# Patient Record
Sex: MALE
[De-identification: names, ages, dates, MRNs, and addresses within clinical notes are randomized per-mention and may not be internally consistent; named-entity substitution may affect disease eponyms.]

---

## 2017-11-13 PROBLEM — Z00.00 ENCOUNTER FOR PREVENTIVE HEALTH EXAMINATION: Status: ACTIVE | Noted: 2017-11-13

## 2017-12-11 ENCOUNTER — APPOINTMENT (OUTPATIENT)
Dept: SURGERY | Facility: CLINIC | Age: 47
End: 2017-12-11
Payer: COMMERCIAL

## 2017-12-11 VITALS
TEMPERATURE: 98.5 F | DIASTOLIC BLOOD PRESSURE: 88 MMHG | SYSTOLIC BLOOD PRESSURE: 137 MMHG | WEIGHT: 212 LBS | HEIGHT: 70 IN | OXYGEN SATURATION: 98 % | BODY MASS INDEX: 30.35 KG/M2 | HEART RATE: 85 BPM

## 2017-12-11 DIAGNOSIS — Z83.3 FAMILY HISTORY OF DIABETES MELLITUS: ICD-10-CM

## 2017-12-11 DIAGNOSIS — Z86.79 PERSONAL HISTORY OF OTHER DISEASES OF THE CIRCULATORY SYSTEM: ICD-10-CM

## 2017-12-11 DIAGNOSIS — Z78.9 OTHER SPECIFIED HEALTH STATUS: ICD-10-CM

## 2017-12-11 DIAGNOSIS — Z87.891 PERSONAL HISTORY OF NICOTINE DEPENDENCE: ICD-10-CM

## 2017-12-11 DIAGNOSIS — Z82.49 FAMILY HISTORY OF ISCHEMIC HEART DISEASE AND OTHER DISEASES OF THE CIRCULATORY SYSTEM: ICD-10-CM

## 2017-12-11 PROCEDURE — 99244 OFF/OP CNSLTJ NEW/EST MOD 40: CPT

## 2017-12-11 RX ORDER — METOPROLOL TARTRATE 75 MG/1
TABLET, FILM COATED ORAL
Refills: 0 | Status: ACTIVE | COMMUNITY

## 2018-01-22 ENCOUNTER — APPOINTMENT (OUTPATIENT)
Dept: SURGERY | Facility: CLINIC | Age: 48
End: 2018-01-22
Payer: COMMERCIAL

## 2018-01-22 VITALS
RESPIRATION RATE: 16 BRPM | BODY MASS INDEX: 30.92 KG/M2 | DIASTOLIC BLOOD PRESSURE: 83 MMHG | TEMPERATURE: 98.1 F | HEIGHT: 70 IN | SYSTOLIC BLOOD PRESSURE: 128 MMHG | HEART RATE: 73 BPM | WEIGHT: 216 LBS | OXYGEN SATURATION: 97 %

## 2018-01-22 DIAGNOSIS — L30.9 DERMATITIS, UNSPECIFIED: ICD-10-CM

## 2018-01-22 PROCEDURE — 99214 OFFICE O/P EST MOD 30 MIN: CPT

## 2018-04-23 ENCOUNTER — APPOINTMENT (OUTPATIENT)
Dept: SURGERY | Facility: CLINIC | Age: 48
End: 2018-04-23
Payer: COMMERCIAL

## 2018-04-23 VITALS
OXYGEN SATURATION: 98 % | HEIGHT: 70 IN | BODY MASS INDEX: 30.06 KG/M2 | WEIGHT: 210 LBS | SYSTOLIC BLOOD PRESSURE: 150 MMHG | RESPIRATION RATE: 16 BRPM | HEART RATE: 85 BPM | TEMPERATURE: 98.4 F | DIASTOLIC BLOOD PRESSURE: 87 MMHG

## 2018-04-23 PROCEDURE — 99213 OFFICE O/P EST LOW 20 MIN: CPT

## 2018-06-18 ENCOUNTER — APPOINTMENT (OUTPATIENT)
Dept: SURGERY | Facility: CLINIC | Age: 48
End: 2018-06-18
Payer: COMMERCIAL

## 2018-06-18 VITALS
HEIGHT: 70 IN | OXYGEN SATURATION: 99 % | SYSTOLIC BLOOD PRESSURE: 123 MMHG | TEMPERATURE: 98.4 F | HEART RATE: 86 BPM | WEIGHT: 210 LBS | RESPIRATION RATE: 16 BRPM | BODY MASS INDEX: 30.06 KG/M2 | DIASTOLIC BLOOD PRESSURE: 69 MMHG

## 2018-06-18 DIAGNOSIS — R10.33 PERIUMBILICAL PAIN: ICD-10-CM

## 2018-06-18 DIAGNOSIS — E66.9 OBESITY, UNSPECIFIED: ICD-10-CM

## 2018-06-18 DIAGNOSIS — K42.9 UMBILICAL HERNIA W/OUT OBSTRUCTION OR GANGRENE: ICD-10-CM

## 2018-06-18 PROCEDURE — 99214 OFFICE O/P EST MOD 30 MIN: CPT

## 2018-07-23 PROBLEM — Z78.9 ALCOHOL USE: Status: ACTIVE | Noted: 2017-12-11

## 2018-09-17 ENCOUNTER — APPOINTMENT (OUTPATIENT)
Dept: SURGERY | Facility: CLINIC | Age: 48
End: 2018-09-17

## 2018-09-26 ENCOUNTER — INPATIENT (INPATIENT)
Facility: HOSPITAL | Age: 48
LOS: 6 days | Discharge: HOME | DRG: 433 | End: 2018-10-03
Attending: INTERNAL MEDICINE | Admitting: SURGERY
Payer: COMMERCIAL

## 2018-09-26 VITALS — WEIGHT: 212.08 LBS | HEIGHT: 70 IN

## 2018-09-26 DIAGNOSIS — K42.0 UMBILICAL HERNIA WITH OBSTRUCTION, WITHOUT GANGRENE: ICD-10-CM

## 2018-09-26 DIAGNOSIS — I10 ESSENTIAL (PRIMARY) HYPERTENSION: ICD-10-CM

## 2018-09-26 DIAGNOSIS — K56.609 UNSPECIFIED INTESTINAL OBSTRUCTION, UNSPECIFIED AS TO PARTIAL VERSUS COMPLETE OBSTRUCTION: ICD-10-CM

## 2018-09-26 LAB
ABO RH CONFIRMATION: SIGNIFICANT CHANGE UP
ALBUMIN SERPL ELPH-MCNC: 1.8 G/DL — LOW (ref 3.3–5.2)
ALP SERPL-CCNC: 115 U/L — SIGNIFICANT CHANGE UP (ref 40–120)
ALT FLD-CCNC: 29 U/L — SIGNIFICANT CHANGE UP
ANION GAP SERPL CALC-SCNC: 10 MMOL/L — SIGNIFICANT CHANGE UP (ref 5–17)
ANISOCYTOSIS BLD QL: SLIGHT — SIGNIFICANT CHANGE UP
APTT BLD: 30.4 SEC — SIGNIFICANT CHANGE UP (ref 27.5–37.4)
AST SERPL-CCNC: 67 U/L — HIGH
BILIRUB SERPL-MCNC: 2.2 MG/DL — HIGH (ref 0.4–2)
BLD GP AB SCN SERPL QL: SIGNIFICANT CHANGE UP
BUN SERPL-MCNC: 8 MG/DL — SIGNIFICANT CHANGE UP (ref 8–20)
CALCIUM SERPL-MCNC: 7 MG/DL — LOW (ref 8.6–10.2)
CHLORIDE SERPL-SCNC: 93 MMOL/L — LOW (ref 98–107)
CO2 SERPL-SCNC: 31 MMOL/L — HIGH (ref 22–29)
CREAT SERPL-MCNC: 0.65 MG/DL — SIGNIFICANT CHANGE UP (ref 0.5–1.3)
EOSINOPHIL # BLD AUTO: 0.1 K/UL — SIGNIFICANT CHANGE UP (ref 0–0.5)
EOSINOPHIL NFR BLD AUTO: 1 % — SIGNIFICANT CHANGE UP (ref 0–6)
GLUCOSE SERPL-MCNC: 122 MG/DL — HIGH (ref 70–115)
HCT VFR BLD CALC: 32.6 % — LOW (ref 42–52)
HGB BLD-MCNC: 10.6 G/DL — LOW (ref 14–18)
HYPOCHROMIA BLD QL: SLIGHT — SIGNIFICANT CHANGE UP
INR BLD: 1.28 RATIO — HIGH (ref 0.88–1.16)
LACTATE BLDV-MCNC: 2.2 MMOL/L — HIGH (ref 0.5–2)
LACTATE BLDV-MCNC: 2.5 MMOL/L — HIGH (ref 0.5–2)
LIDOCAIN IGE QN: 24 U/L — SIGNIFICANT CHANGE UP (ref 22–51)
LYMPHOCYTES # BLD AUTO: 0.9 K/UL — LOW (ref 1–4.8)
LYMPHOCYTES # BLD AUTO: 7 % — LOW (ref 20–55)
MCHC RBC-ENTMCNC: 25 PG — LOW (ref 27–31)
MCHC RBC-ENTMCNC: 32.5 G/DL — SIGNIFICANT CHANGE UP (ref 32–36)
MCV RBC AUTO: 76.9 FL — LOW (ref 80–94)
MONOCYTES # BLD AUTO: 0.5 K/UL — SIGNIFICANT CHANGE UP (ref 0–0.8)
MONOCYTES NFR BLD AUTO: 5 % — SIGNIFICANT CHANGE UP (ref 3–10)
NEUTROPHILS # BLD AUTO: 11.2 K/UL — HIGH (ref 1.8–8)
NEUTROPHILS NFR BLD AUTO: 87 % — HIGH (ref 37–73)
PLAT MORPH BLD: NORMAL — SIGNIFICANT CHANGE UP
PLATELET # BLD AUTO: 285 K/UL — SIGNIFICANT CHANGE UP (ref 150–400)
POTASSIUM SERPL-MCNC: 3.2 MMOL/L — LOW (ref 3.5–5.3)
POTASSIUM SERPL-SCNC: 3.2 MMOL/L — LOW (ref 3.5–5.3)
PROT SERPL-MCNC: 5.5 G/DL — LOW (ref 6.6–8.7)
PROTHROM AB SERPL-ACNC: 14.2 SEC — HIGH (ref 9.8–12.7)
RBC # BLD: 4.24 M/UL — LOW (ref 4.6–6.2)
RBC # FLD: 17.8 % — HIGH (ref 11–15.6)
RBC BLD AUTO: ABNORMAL
SODIUM SERPL-SCNC: 134 MMOL/L — LOW (ref 135–145)
TYPE + AB SCN PNL BLD: SIGNIFICANT CHANGE UP
WBC # BLD: 12.6 K/UL — HIGH (ref 4.8–10.8)
WBC # FLD AUTO: 12.6 K/UL — HIGH (ref 4.8–10.8)

## 2018-09-26 PROCEDURE — 99284 EMERGENCY DEPT VISIT MOD MDM: CPT

## 2018-09-26 PROCEDURE — 74177 CT ABD & PELVIS W/CONTRAST: CPT | Mod: 26

## 2018-09-26 PROCEDURE — 99223 1ST HOSP IP/OBS HIGH 75: CPT

## 2018-09-26 PROCEDURE — 71045 X-RAY EXAM CHEST 1 VIEW: CPT | Mod: 26

## 2018-09-26 RX ORDER — PANTOPRAZOLE SODIUM 20 MG/1
40 TABLET, DELAYED RELEASE ORAL
Qty: 0 | Refills: 0 | Status: DISCONTINUED | OUTPATIENT
Start: 2018-09-26 | End: 2018-10-03

## 2018-09-26 RX ORDER — SODIUM CHLORIDE 9 MG/ML
1000 INJECTION, SOLUTION INTRAVENOUS ONCE
Qty: 0 | Refills: 0 | Status: COMPLETED | OUTPATIENT
Start: 2018-09-26 | End: 2018-09-26

## 2018-09-26 RX ORDER — SODIUM CHLORIDE 9 MG/ML
1000 INJECTION INTRAMUSCULAR; INTRAVENOUS; SUBCUTANEOUS ONCE
Qty: 0 | Refills: 0 | Status: COMPLETED | OUTPATIENT
Start: 2018-09-26 | End: 2018-09-26

## 2018-09-26 RX ORDER — METOPROLOL TARTRATE 50 MG
12.5 TABLET ORAL EVERY 12 HOURS
Qty: 0 | Refills: 0 | Status: DISCONTINUED | OUTPATIENT
Start: 2018-09-26 | End: 2018-09-27

## 2018-09-26 RX ORDER — HYDROMORPHONE HYDROCHLORIDE 2 MG/ML
0.5 INJECTION INTRAMUSCULAR; INTRAVENOUS; SUBCUTANEOUS EVERY 4 HOURS
Qty: 0 | Refills: 0 | Status: DISCONTINUED | OUTPATIENT
Start: 2018-09-26 | End: 2018-09-26

## 2018-09-26 RX ORDER — FUROSEMIDE 40 MG
20 TABLET ORAL ONCE
Qty: 0 | Refills: 0 | Status: COMPLETED | OUTPATIENT
Start: 2018-09-26 | End: 2018-09-26

## 2018-09-26 RX ORDER — ONDANSETRON 8 MG/1
4 TABLET, FILM COATED ORAL EVERY 6 HOURS
Qty: 0 | Refills: 0 | Status: DISCONTINUED | OUTPATIENT
Start: 2018-09-26 | End: 2018-10-03

## 2018-09-26 RX ORDER — SODIUM CHLORIDE 9 MG/ML
3 INJECTION INTRAMUSCULAR; INTRAVENOUS; SUBCUTANEOUS ONCE
Qty: 0 | Refills: 0 | Status: COMPLETED | OUTPATIENT
Start: 2018-09-26 | End: 2018-09-26

## 2018-09-26 RX ORDER — ACETAMINOPHEN 500 MG
1000 TABLET ORAL ONCE
Qty: 0 | Refills: 0 | Status: DISCONTINUED | OUTPATIENT
Start: 2018-09-26 | End: 2018-10-03

## 2018-09-26 RX ORDER — POTASSIUM CHLORIDE 20 MEQ
10 PACKET (EA) ORAL
Qty: 0 | Refills: 0 | Status: COMPLETED | OUTPATIENT
Start: 2018-09-26 | End: 2018-09-26

## 2018-09-26 RX ORDER — MORPHINE SULFATE 50 MG/1
4 CAPSULE, EXTENDED RELEASE ORAL ONCE
Qty: 0 | Refills: 0 | Status: DISCONTINUED | OUTPATIENT
Start: 2018-09-26 | End: 2018-09-26

## 2018-09-26 RX ORDER — SODIUM CHLORIDE 9 MG/ML
1000 INJECTION, SOLUTION INTRAVENOUS
Qty: 0 | Refills: 0 | Status: DISCONTINUED | OUTPATIENT
Start: 2018-09-26 | End: 2018-09-27

## 2018-09-26 RX ORDER — SPIRONOLACTONE 25 MG/1
25 TABLET, FILM COATED ORAL DAILY
Qty: 0 | Refills: 0 | Status: DISCONTINUED | OUTPATIENT
Start: 2018-09-26 | End: 2018-09-27

## 2018-09-26 RX ORDER — ONDANSETRON 8 MG/1
4 TABLET, FILM COATED ORAL ONCE
Qty: 0 | Refills: 0 | Status: COMPLETED | OUTPATIENT
Start: 2018-09-26 | End: 2018-09-26

## 2018-09-26 RX ORDER — METOPROLOL TARTRATE 50 MG
25 TABLET ORAL DAILY
Qty: 0 | Refills: 0 | Status: DISCONTINUED | OUTPATIENT
Start: 2018-09-26 | End: 2018-09-26

## 2018-09-26 RX ORDER — HYDROCHLOROTHIAZIDE 25 MG
25 TABLET ORAL DAILY
Qty: 0 | Refills: 0 | Status: DISCONTINUED | OUTPATIENT
Start: 2018-09-26 | End: 2018-09-26

## 2018-09-26 RX ORDER — ENOXAPARIN SODIUM 100 MG/ML
40 INJECTION SUBCUTANEOUS DAILY
Qty: 0 | Refills: 0 | Status: DISCONTINUED | OUTPATIENT
Start: 2018-09-26 | End: 2018-09-27

## 2018-09-26 RX ADMIN — SODIUM CHLORIDE 150 MILLILITER(S): 9 INJECTION, SOLUTION INTRAVENOUS at 17:49

## 2018-09-26 RX ADMIN — Medication 100 MILLIEQUIVALENT(S): at 17:48

## 2018-09-26 RX ADMIN — SODIUM CHLORIDE 1000 MILLILITER(S): 9 INJECTION, SOLUTION INTRAVENOUS at 13:42

## 2018-09-26 RX ADMIN — SODIUM CHLORIDE 150 MILLILITER(S): 9 INJECTION, SOLUTION INTRAVENOUS at 15:16

## 2018-09-26 RX ADMIN — SODIUM CHLORIDE 3 MILLILITER(S): 9 INJECTION INTRAMUSCULAR; INTRAVENOUS; SUBCUTANEOUS at 11:11

## 2018-09-26 RX ADMIN — Medication 100 MILLIEQUIVALENT(S): at 13:29

## 2018-09-26 RX ADMIN — MORPHINE SULFATE 4 MILLIGRAM(S): 50 CAPSULE, EXTENDED RELEASE ORAL at 11:11

## 2018-09-26 RX ADMIN — MORPHINE SULFATE 4 MILLIGRAM(S): 50 CAPSULE, EXTENDED RELEASE ORAL at 11:26

## 2018-09-26 RX ADMIN — Medication 104 MILLIGRAM(S): at 19:21

## 2018-09-26 RX ADMIN — ONDANSETRON 4 MILLIGRAM(S): 8 TABLET, FILM COATED ORAL at 11:11

## 2018-09-26 RX ADMIN — SODIUM CHLORIDE 1000 MILLILITER(S): 9 INJECTION INTRAMUSCULAR; INTRAVENOUS; SUBCUTANEOUS at 11:32

## 2018-09-26 RX ADMIN — Medication 12.5 MILLIGRAM(S): at 17:48

## 2018-09-26 RX ADMIN — SODIUM CHLORIDE 1000 MILLILITER(S): 9 INJECTION INTRAMUSCULAR; INTRAVENOUS; SUBCUTANEOUS at 11:11

## 2018-09-26 RX ADMIN — Medication 100 MILLIEQUIVALENT(S): at 15:15

## 2018-09-26 NOTE — ED ADULT TRIAGE NOTE - CHIEF COMPLAINT QUOTE
"I am having abdominal pain in the center of my abdomen I know that I have an umbilical hernia and I saw Dr Briseno about a month ago and he said if it worsened to come in" Pt states he has nausea and vomiting and is unable to use the bathroom.

## 2018-09-26 NOTE — H&P ADULT - ASSESSMENT
> Cirrhosis with portal hypertension and ascites - likely secondary to ETOH abuse.  Start on diuretics, aldactone.  Need for alcohol cessation d/w patient.  He is aware of the severity of his illness, was in agreement.  Left message for IR for therapeutic paracentesis, may be performed non emergently.  > HTN - Continue BBlocker.  Will Hold HCTZ as pt will be diuresed.  > Paraesophageal varices - No s/s of bleeding.  Monitor H/H.  Start on PPI.  > DVT Prophylaxis - Lower extremity intermittent compression devices.
49 y/o male with incarcerated umbilical hernia, possibly strangulated - CT scan pending. Leukocytosis 12.6, tacyhcardic with lactate 2.2

## 2018-09-26 NOTE — CONSULT NOTE ADULT - ASSESSMENT
47 y/o M h/o HTN, alcohol use with clinical and radiographic evidence of liver cirrhosis with ascites. Patient has umbilical hernia that is fluid filled and non-obstructing on physical exam and CT. Currently hemodynamically stable, afebrile.    Plan:  -No surgical intervention at this time  -Admit to medical service for ascites  -Recommend gastroenterology consultation for possible intervention  -Patient should follow-up with Dr. Russell in the outpatient setting for umbilical hernia    Plan discussed with attending Dr. Soto.

## 2018-09-26 NOTE — CONSULT NOTE ADULT - SUBJECTIVE AND OBJECTIVE BOX
ACUTE CARE SURGERY CONSULT    Consulting surgical team: [facility] - Acute Care Surgery  Consulting attending: Dr. Soto  Patient seen and examined: 09-26-18 @ 15:16    HPI: 47 y/o M h/o HTN, alcohol use >30 years that presents with abdominal pain. Surgery consulted for possible umbilical hernia. Patient states he has had intermittent mild generalized abdominal pain for about 2 months that is not improved or relieved by anything. Yesterday, abdominal pain became severe in nature, and was periumbilical in location. Pain described as pressure. Associated nausea and multiple episodes of vomiting over the past several days. Last bowel movement and/flatus was 1 week ago. He notes new abdominal swelling over the past week or so. No fever, chills, chest pain, or dyspnea. Patient is seen by Dr. Russell as an outpatient for umbilical hernia management.     PMHx: HTN, takes pill for "water retention"  PSHx: denies   Allergies: PCN - unsure of reaction, states he was told as a child he was allergic  Medications: anti-hypertensive (unsure of name), "water pill" (26 Sep 2018 12:59)      PAST MEDICAL HISTORY:  Hypertension    PAST SURGICAL HISTORY:  No significant past surgical history    ALLERGIES:  penicillin (Hives)    MEDICATIONS  (STANDING):  clindamycin IVPB 900 milliGRAM(s) IV Intermittent once  enoxaparin Injectable 40 milliGRAM(s) SubCutaneous daily  hydrochlorothiazide 25 milliGRAM(s) Oral daily  lactated ringers. 1000 milliLiter(s) (150 mL/Hr) IV Continuous <Continuous>  metoprolol succinate ER 25 milliGRAM(s) Oral daily  potassium chloride  10 mEq/100 mL IVPB 10 milliEquivalent(s) IV Intermittent every 1 hour    MEDICATIONS  (PRN):  acetaminophen  IVPB .. 1000 milliGRAM(s) IV Intermittent once PRN Mild Pain (1 - 3)  HYDROmorphone  Injectable 0.5 milliGRAM(s) IV Push every 4 hours PRN Moderate Pain (4 - 6)  ondansetron Injectable 4 milliGRAM(s) IV Push every 6 hours PRN Nausea and/or Vomiting      VITALS & I/Os:  Vital Signs Last 24 Hrs  T(C): 37 (26 Sep 2018 12:04), Max: 37 (26 Sep 2018 12:04)  T(F): 98.6 (26 Sep 2018 12:04), Max: 98.6 (26 Sep 2018 12:04)  HR: 84 (26 Sep 2018 12:04) (84 - 105)  BP: 122/71 (26 Sep 2018 12:04) (122/71 - 125/78)  BP(mean): --  RR: 20 (26 Sep 2018 12:04) (18 - 20)  SpO2: 95% (26 Sep 2018 12:04) (95% - 96%)  CAPILLARY BLOOD GLUCOSE      General: NAD, AOx3, comfortable on examination  HEENT: PERRLA, EOMI, dry mucous membranes  Neck: supple, nontender  Cardiovascular: heart RRR, no murmurs  Respiratory: no respiratory distress, CTAB  Abdomen: soft, nontender. Positive fluid wave test. Umbilical hernia filled with fluid, about 0.5 cm defect, no bowel or fat content within hernia. No guarding or rebound. Normal bowel sounds.  Extremities: no peripheral edema. Normal ROM.  Integumentary: warm, no rash  Neuro: no motor or sensory deficits    LABS:                        10.6   12.6  )-----------( 285      ( 26 Sep 2018 11:02 )             32.6     09-26    134<L>  |  93<L>  |  8.0  ----------------------------<  122<H>  3.2<L>   |  31.0<H>  |  0.65    Ca    7.0<L>      26 Sep 2018 11:02    TPro  5.5<L>  /  Alb  1.8<L>  /  TBili  2.2<H>  /  DBili  x   /  AST  67<H>  /  ALT  29  /  AlkPhos  115  09-26      PT/INR - ( 26 Sep 2018 14:01 )   PT: 14.2 sec;   INR: 1.28 ratio         PTT - ( 26 Sep 2018 13:17 )  PTT:30.4 sec    09-26 @ 11:03  2.2        IMAGING:    EXAM:  CT ABDOMEN AND PELVIS OC IC                          PROCEDURE DATE:  09/26/2018      INTERPRETATION:  DATE: 9/26/2018 12:52 PM  COMPARISON: None  CLINICAL INFORMATION: Abdominal pain, umbilical hernia  PROCEDURE:  CT of the Abdomen and Pelvis was performed withintravenous   contrast.  90 ml of Omnipaque 350 was injected intravenously.     Oral   contrast was not administered secondary to ordering physician request      FINDINGS:    LOWER CHEST: Trace to small bilateral pleural effusions with minimal   bibasilar atelectasis.    ABDOMEN AND PELVIS:    LIVER: Morphologic changes in the liver compatible with cirrhosis.   Extensive areas of decreased attenuation throughout the liver   particularly in the right hepatic lobe may represent areas of severe   hepatic steatosis. Round areas of hypoattenuation in the left hepatic   lobe may represent areas of fatty infiltration however focal hepatic   masses cannot be excluded. An infiltrating hepatocellular carcinoma also   cannot be excluded. Small area of hypervascularity in the right hepatic   dome measuring 0.6 cm, series 2 image 24. Rosa hepatis and portacaval   lymph nodes likely reactive in nature. Portal vein is patent. Large   paraesophageal varices identified. Patent umbilical vein. A splenorenal   shunt is visualized.  BILE DUCTS: normal caliber.  GALLBLADDER: No calcified gallstones. Normal caliber wall.  PANCREAS: within normal limits.  SPLEEN: within normal limits.    ADRENALS: within normal limits.  KIDNEYS, URETERS AND BLADDER: Kidneys enhance bilaterally and   symmetrically without hydronephrosis, renal mass or renal calculus. The   ureters and bladder are within normal limits.    REPRODUCTIVE ORGANS: No pelvic masses. Mild pelvic free fluid.No pelvic   lymphadenopathy.    BOWEL: No bowel obstruction. Colonic diverticulosis without   diverticulitis. Mild thickening of the right colon likely related to the   cirrhosis and portal hypertension. Mild thickening of a mild amount of   small bowel in the midabdomen suggesting jejunitis and/or ileitis..   Normal appendix. Mesenteric and right lower quadrant lymph nodes. A   normal appendix was visualized.No enlarged mesenteric lymph nodes.  PERITONEUM: Moderate abdominal pelvic ascites. Mesenteric edema. No free   air.    VESSELS: Atherosclerotic changes .Findings as described above consistent   with portal hypertension. Accessory right hepatic artery from the   superior mesenteric artery, a normal variant.  RETROPERITONEUM:     Within normal limits.      ABDOMINAL WALL: Umbilical hernia and supraumbilical hernia containing   fluid. Mild to moderate anasarca.  BONES: Degenerative changes.    IMPRESSION:      Cirrhosis with portal hypertension with moderate abdominal and pelvic   ascites and mesenteric edema. Large paraesophageal varices and a   splenorenal shunt.    Diffuse areas of hypoattenuation throughout the right and left hepatic   lobe which may represent periportal fat deposition however hepatocellular   carcinoma and/or infiltrating hepatocellular carcinoma cannot be   excluded. Further evaluation with MRI of the abdomen with and without   contrast is recommended and can be obtained in a nonemergent basis.    No bowel obstruction.  Mild thickening of a mild amount of small bowel in   the midabdomen suggesting jejunitis and/or ileitis    Umbilical and supraumbilical hernias containing fluid.    SIERRA GRAMAJO M.D., ATTENDING RADIOLOGIST  This document has been electronically signed. Sep 26 2018  1:22PM

## 2018-09-26 NOTE — H&P ADULT - NSHPPHYSICALEXAM_GEN_ALL_CORE
PHYSICAL EXAMINATION:  GENERAL: NAD, Alert and Oriented x 3 HEENT:  Normocephalic and atraumatic.  Extraocular muscles are intact.  NECK: Supple.  - JVD.  CARDIOVASCULAR: RRR S1, S2.  LUNGS: CTAB, - rales, - wheezing, - rhonchi.  BACK: - CVA tenderness.  ABDOMEN: soft, (mild umbilical) tenderness, (+) distension with ascites, (+) bowel sounds, (-) guarding, (-) rebound (-) rigidity, soft umbilical hernia, appears to be fluid filled. EXTREMITIES: - cyanosis, - clubbing, + edema.  NEUROLOGIC: strength is symmetric, sensation intact, speech fluent.  PSYCHIATRIC: Calm.  - agitation.    SKIN: - rashes, - lesions.
MEDICATIONS  (STANDING):  clindamycin IVPB 900 milliGRAM(s) IV Intermittent once  enoxaparin Injectable 40 milliGRAM(s) SubCutaneous daily  lactated ringers Bolus 1000 milliLiter(s) IV Bolus once  lactated ringers. 1000 milliLiter(s) (150 mL/Hr) IV Continuous <Continuous>  potassium chloride  10 mEq/100 mL IVPB 10 milliEquivalent(s) IV Intermittent every 1 hour    MEDICATIONS  (PRN):  acetaminophen  IVPB .. 1000 milliGRAM(s) IV Intermittent once PRN Mild Pain (1 - 3)  HYDROmorphone  Injectable 0.5 milliGRAM(s) IV Push every 4 hours PRN Mild Pain (1 - 3)      Vital Signs Last 24 Hrs  T(C): 37 (26 Sep 2018 12:04), Max: 37 (26 Sep 2018 12:04)  T(F): 98.6 (26 Sep 2018 12:04), Max: 98.6 (26 Sep 2018 12:04)  HR: 84 (26 Sep 2018 12:04) (84 - 105)  BP: 122/71 (26 Sep 2018 12:04) (122/71 - 125/78)  BP(mean): --  RR: 20 (26 Sep 2018 12:04) (18 - 20)  SpO2: 95% (26 Sep 2018 12:04) (95% - 96%)    Constitutional: well developed well nourished male, NAD  HEENT: EOMI / PERRL b/l, no active drainage or redness  Neck: No JVD, ROM without pain  Respiratory: respirations are unlabored, no accessory muscle use, no conversational dyspnea  Cardiovascular: regular rate & rhythm  Gastrointestinal: abdomen significantly distended, + tympanic to percussion, umbilical hernia unable to be reduced, skin slightly hyperpigmented not erythematous or cellulitic, mild TTP, no peritoneal signs, no rebound tenderness / guarding  Neurological: GCS: 15 (4/5/6). A&O x 3; no gross sensory / motor / coordination deficits  Psychiatric: Normal mood, normal affect  Musculoskeletal: No joint pain, swelling or deformity; no limitation of movement

## 2018-09-26 NOTE — PROGRESS NOTE ADULT - ASSESSMENT
> Cirrhosis with portal hypertension and ascites - likely secondary to ETOH abuse.  Start on diuretics, aldactone.  Need for alcohol cessation d/w patient.  He is aware of the severity of his illness, was in agreement.  Left message for IR for therapeutic paracentesis, may be performed non emergently.  > HTN - Continue BBlocker.  Will Hold HCTZ as pt will be diuresed.  > Paraesophageal varices - No s/s of bleeding.  Monitor H/H.  Start on PPI.  > DVT Prophylaxis - Lower extremity intermittent compression devices.

## 2018-09-26 NOTE — ED STATDOCS - CARDIAC, MLM
normal rate, regular rhythm, and no murmur. normal rate, regular rhythm, and no murmur. 2+ pitting edema of LE.

## 2018-09-26 NOTE — H&P ADULT - PROBLEM SELECTOR PLAN 2
- Clarified with pts pharmacy ([facility]) he is on HCTZ 12.5 QD and metoprolol 25 QD.

## 2018-09-26 NOTE — ED STATDOCS - OBJECTIVE STATEMENT
49 y/o M pt with hx of HTN, retain water presents to ED c/o protrusion/ bulging of his abdominal x2 year worsening since, with associate pain, N/V and constipation x1 week. Last BM last Wednesday. Unable to tolerate PO.  Allergy to penicillin. He states his doctor told him to go to ED if symptoms increase. He has episodes of worsening distension when hernia flares up. Denies surgery of abd, fever, chills, CP, back pain,

## 2018-09-26 NOTE — ED STATDOCS - PROGRESS NOTE DETAILS
Lactate of 2.2, will order LR bolus, spoke to surgery who will come see pt surgery team saw the patient, will take the patient to OR under Dr. Soto

## 2018-09-26 NOTE — ED STATDOCS - MEDICAL DECISION MAKING DETAILS
PT with will order CT abdomen. Pt likely with abdominal strangulated hernia, will get lower CT scan lactate and surgical consult.

## 2018-09-26 NOTE — ED ADULT NURSE NOTE - CHIEF COMPLAINT QUOTE
"I am having abdominal pain in the center of my abdomen I know that I have an umbilical hernia and I saw Dr Briseno about a month ago and he said if it worsened to come in" Pt states he has nausea and vomiting and is unable to use the bathroom.
musculoskeletal/cognitive

## 2018-09-26 NOTE — H&P ADULT - HISTORY OF PRESENT ILLNESS
47 y/o male presents to ED complaining of 1 day of pain at site of umbilical hernia. Pt states he first developed hernia approx 2 years ago, it has never caused him pain until yesterday, and he had previously been able to self-reduce it. Pt reports yesterday he had acute onset of pain at hernia site and was no longer able to reduce it. Describes it as a sharp, constant pain that is localized to caroline-umbilical area. Associated with nausea and vomiting - states he has been unable to tolerate any oral intake. States last BM was 1 week ago and denies having passed flatus over past few days. Denies fever / chills, chest pain, SOB, urinary symptoms, recent travel, sick contacts.    PMHx: HTN, takes pill for "water retention"  PSHx: denies   Allergies: PCN - unsure of reaction, states he was told as a child he was allergic  Medications: anti-hypertensive (unsure of name), "water pill"
48M h/o HTN, EtOH Abuse, Cirrhosis x 2.5 years, presented to ER with abdominal pain x 3 days, seen by surgery for possible incarcerated umbilical hernia.  CT A/P showed cirrhosis with significant ascites.  Per surgery, no incarcerated bowel.  Pt admits to daily EtOH use since age 18.  States he was diagnosed with cirrhosis in [facility] 2.5y ago, but has not followed up.  Noted worsening edema and that his umbilical hernia was not reducible.  Last BM ~ 5 days ago.  No nausea / vomiting.  Denies SOB.  No additional complaints.     FAMILY HISTORY: reviewed.  No history of liver disease in mother, father.   SOCIAL HISTORY: + alcohol, - IVDA, - smoking.  REVIEW OF SYSTEMS: General: - fatigue, - weight loss, - fevers, - chills.  HEENT: - headache, - hearing disturbances.  EYES: - visual disturbances, - diplopia.  CARDIOVASCULAR: - chest pain, - palpitations.  PULMONARY: - SOB, - cough, - hemoptysis.  GI: + abdominal pain, - nausea, - vomiting, - diarrhea, - constipation.  : - urinary urgency, - frequency, - dysuria.  MUSCULOSKELETAL: - arthralgias, - myalgias.  NEURO:  - weakness, - numbness.  PSYCH: - depression, - anxiety, - suicidal thoughts. SKIN: - rashes, - lesions.  All remaining ROS are negative

## 2018-09-26 NOTE — H&P ADULT - PROBLEM SELECTOR PLAN 1
- Admit to ACS service under Dr. Soto  - Patient will be ADD-ON for OR today - Admit to ACS service under Dr. Soto  - Patient will be ADD ON for OR today for hernia repair  - NPO with LR @ 150cc/hr  - Rcving LR bolus for elevated lactate - will f/u rpt lactate post-op  - IV K+ repletion   - Clinda for SCIP (PCN allergy)  - Pain control PRN  - DVT ppx with lovenox & b/l SCDs  - Incentive spirometer for pulm toileting

## 2018-09-26 NOTE — H&P ADULT - NSHPLABSRESULTS_GEN_ALL_CORE
VITALS:  Vital Signs Last 24 Hrs  T(C): 36.8 (26 Sep 2018 15:54), Max: 37 (26 Sep 2018 12:04)  T(F): 98.2 (26 Sep 2018 15:54), Max: 98.6 (26 Sep 2018 12:04)  HR: 82 (26 Sep 2018 15:54) (82 - 105)  BP: 123/76 (26 Sep 2018 15:54) (122/71 - 125/78)  BP(mean): --  RR: 20 (26 Sep 2018 12:04) (18 - 20)  SpO2: 97% (26 Sep 2018 15:54) (95% - 97%) Daily Height in cm: 177.8 (26 Sep 2018 10:01)    Daily   CAPILLARY BLOOD GLUCOSE        I&O's Summary      LABS:                        10.6   12.6  )-----------( 285      ( 26 Sep 2018 11:02 )             32.6     09-26    134<L>  |  93<L>  |  8.0  ----------------------------<  122<H>  3.2<L>   |  31.0<H>  |  0.65    Ca    7.0<L>      26 Sep 2018 11:02    TPro  5.5<L>  /  Alb  1.8<L>  /  TBili  2.2<H>  /  DBili  x   /  AST  67<H>  /  ALT  29  /  AlkPhos  115  09-26    PT/INR - ( 26 Sep 2018 14:01 )   PT: 14.2 sec;   INR: 1.28 ratio         PTT - ( 26 Sep 2018 13:17 )  PTT:30.4 sec  LIVER FUNCTIONS - ( 26 Sep 2018 11:02 )  Alb: 1.8 g/dL / Pro: 5.5 g/dL / ALK PHOS: 115 U/L / ALT: 29 U/L / AST: 67 U/L / GGT: x                   MEDICATIONS:  acetaminophen  IVPB .. 1000 milliGRAM(s) IV Intermittent once PRN  clindamycin IVPB 900 milliGRAM(s) IV Intermittent once  enoxaparin Injectable 40 milliGRAM(s) SubCutaneous daily  hydrochlorothiazide 25 milliGRAM(s) Oral daily  HYDROmorphone  Injectable 0.5 milliGRAM(s) IV Push every 4 hours PRN  lactated ringers. 1000 milliLiter(s) IV Continuous <Continuous>  metoprolol succinate ER 25 milliGRAM(s) Oral daily  ondansetron Injectable 4 milliGRAM(s) IV Push every 6 hours PRN  potassium chloride  10 mEq/100 mL IVPB 10 milliEquivalent(s) IV Intermittent every 1 hour
10.6   12.6  )-----------( 285      ( 26 Sep 2018 11:02 )             32.6     09-26    134<L>  |  93<L>  |  8.0  ----------------------------<  122<H>  3.2<L>   |  31.0<H>  |  0.65    Ca    7.0<L>      26 Sep 2018 11:02    TPro  5.5<L>  /  Alb  1.8<L>  /  TBili  2.2<H>  /  DBili  x   /  AST  67<H>  /  ALT  29  /  AlkPhos  115  09-26

## 2018-09-26 NOTE — ED ADULT NURSE NOTE - OBJECTIVE STATEMENT
pt c/o worsening hernia symptoms and unable to tolerate any PO intake for almost a week. Last BM unknown, approx 4-5 days ago pt c/o worsening hernia symptoms and unable to tolerate any PO intake for almost a week. Last BM unknown, approx 4-5 days ago.  Patient has +4 edema to lower extremities.

## 2018-09-26 NOTE — ED ADULT NURSE NOTE - NSIMPLEMENTINTERV_GEN_ALL_ED
Implemented All Universal Safety Interventions:  Gay to call system. Call bell, personal items and telephone within reach. Instruct patient to call for assistance. Room bathroom lighting operational. Non-slip footwear when patient is off stretcher. Physically safe environment: no spills, clutter or unnecessary equipment. Stretcher in lowest position, wheels locked, appropriate side rails in place.

## 2018-09-26 NOTE — ED STATDOCS - GASTROINTESTINAL, MLM
abdomen soft,  and non-distended. Bowel sounds present. abdomen with umbilical hernia, diffuse abdominal tenderness throughout.

## 2018-09-27 DIAGNOSIS — K70.31 ALCOHOLIC CIRRHOSIS OF LIVER WITH ASCITES: ICD-10-CM

## 2018-09-27 LAB
AFP-TM SERPL-MCNC: 7.5 NG/ML — SIGNIFICANT CHANGE UP
ALBUMIN SERPL ELPH-MCNC: 1.6 G/DL — LOW (ref 3.3–5.2)
ALP SERPL-CCNC: 89 U/L — SIGNIFICANT CHANGE UP (ref 40–120)
ALT FLD-CCNC: 23 U/L — SIGNIFICANT CHANGE UP
ANION GAP SERPL CALC-SCNC: 8 MMOL/L — SIGNIFICANT CHANGE UP (ref 5–17)
AST SERPL-CCNC: 52 U/L — HIGH
B PERT IGG+IGM PNL SER: ABNORMAL
BILIRUB SERPL-MCNC: 1.7 MG/DL — SIGNIFICANT CHANGE UP (ref 0.4–2)
BUN SERPL-MCNC: 9 MG/DL — SIGNIFICANT CHANGE UP (ref 8–20)
CALCIUM SERPL-MCNC: 6.8 MG/DL — LOW (ref 8.6–10.2)
CHLORIDE SERPL-SCNC: 95 MMOL/L — LOW (ref 98–107)
CO2 SERPL-SCNC: 29 MMOL/L — SIGNIFICANT CHANGE UP (ref 22–29)
COLOR FLD: YELLOW
CREAT SERPL-MCNC: 0.62 MG/DL — SIGNIFICANT CHANGE UP (ref 0.5–1.3)
FLUID INTAKE SUBSTANCE CLASS: SIGNIFICANT CHANGE UP
FLUID SEGMENTED GRANULOCYTES: 71 % — SIGNIFICANT CHANGE UP
GLUCOSE SERPL-MCNC: 99 MG/DL — SIGNIFICANT CHANGE UP (ref 70–115)
GRAM STN FLD: SIGNIFICANT CHANGE UP
HCT VFR BLD CALC: 27.4 % — LOW (ref 42–52)
HGB BLD-MCNC: 9.3 G/DL — LOW (ref 14–18)
LACTATE SERPL-SCNC: 1.7 MMOL/L — SIGNIFICANT CHANGE UP (ref 0.5–2)
LYMPHOCYTES # FLD: 14 % — SIGNIFICANT CHANGE UP
MCHC RBC-ENTMCNC: 25.6 PG — LOW (ref 27–31)
MCHC RBC-ENTMCNC: 33.9 G/DL — SIGNIFICANT CHANGE UP (ref 32–36)
MCV RBC AUTO: 75.5 FL — LOW (ref 80–94)
MESOTHL CELL # FLD: 3 % — SIGNIFICANT CHANGE UP
MONOS+MACROS # FLD: 12 % — SIGNIFICANT CHANGE UP
PLATELET # BLD AUTO: 220 K/UL — SIGNIFICANT CHANGE UP (ref 150–400)
POTASSIUM SERPL-MCNC: 3.1 MMOL/L — LOW (ref 3.5–5.3)
POTASSIUM SERPL-SCNC: 3.1 MMOL/L — LOW (ref 3.5–5.3)
PROT SERPL-MCNC: 4.7 G/DL — LOW (ref 6.6–8.7)
RBC # BLD: 3.63 M/UL — LOW (ref 4.6–6.2)
RBC # FLD: 17.5 % — HIGH (ref 11–15.6)
RCV VOL RI: 975 /UL — HIGH (ref 0–5)
SODIUM SERPL-SCNC: 132 MMOL/L — LOW (ref 135–145)
SPECIMEN SOURCE: SIGNIFICANT CHANGE UP
TOTAL NUCLEATED CELL COUNT, BODY FLUID: 168 /UL — HIGH (ref 0–5)
TUBE TYPE: SIGNIFICANT CHANGE UP
WBC # BLD: 4.9 K/UL — SIGNIFICANT CHANGE UP (ref 4.8–10.8)
WBC # FLD AUTO: 4.9 K/UL — SIGNIFICANT CHANGE UP (ref 4.8–10.8)

## 2018-09-27 PROCEDURE — 99223 1ST HOSP IP/OBS HIGH 75: CPT

## 2018-09-27 PROCEDURE — 99232 SBSQ HOSP IP/OBS MODERATE 35: CPT

## 2018-09-27 PROCEDURE — 93010 ELECTROCARDIOGRAM REPORT: CPT

## 2018-09-27 RX ORDER — CEFTRIAXONE 500 MG/1
1 INJECTION, POWDER, FOR SOLUTION INTRAMUSCULAR; INTRAVENOUS ONCE
Qty: 0 | Refills: 0 | Status: COMPLETED | OUTPATIENT
Start: 2018-09-27 | End: 2018-09-27

## 2018-09-27 RX ORDER — POTASSIUM CHLORIDE 20 MEQ
10 PACKET (EA) ORAL
Qty: 0 | Refills: 0 | Status: COMPLETED | OUTPATIENT
Start: 2018-09-27 | End: 2018-09-27

## 2018-09-27 RX ORDER — CEFTRIAXONE 500 MG/1
INJECTION, POWDER, FOR SOLUTION INTRAMUSCULAR; INTRAVENOUS
Qty: 0 | Refills: 0 | Status: DISCONTINUED | OUTPATIENT
Start: 2018-09-27 | End: 2018-09-27

## 2018-09-27 RX ORDER — SPIRONOLACTONE 25 MG/1
100 TABLET, FILM COATED ORAL DAILY
Qty: 0 | Refills: 0 | Status: DISCONTINUED | OUTPATIENT
Start: 2018-09-27 | End: 2018-10-01

## 2018-09-27 RX ORDER — CARVEDILOL PHOSPHATE 80 MG/1
6.25 CAPSULE, EXTENDED RELEASE ORAL EVERY 12 HOURS
Qty: 0 | Refills: 0 | Status: DISCONTINUED | OUTPATIENT
Start: 2018-09-27 | End: 2018-10-03

## 2018-09-27 RX ORDER — INFLUENZA VIRUS VACCINE 15; 15; 15; 15 UG/.5ML; UG/.5ML; UG/.5ML; UG/.5ML
0.5 SUSPENSION INTRAMUSCULAR ONCE
Qty: 0 | Refills: 0 | Status: COMPLETED | OUTPATIENT
Start: 2018-09-27 | End: 2018-09-27

## 2018-09-27 RX ADMIN — CEFTRIAXONE 100 GRAM(S): 500 INJECTION, POWDER, FOR SOLUTION INTRAMUSCULAR; INTRAVENOUS at 12:30

## 2018-09-27 RX ADMIN — SPIRONOLACTONE 100 MILLIGRAM(S): 25 TABLET, FILM COATED ORAL at 15:56

## 2018-09-27 RX ADMIN — SPIRONOLACTONE 25 MILLIGRAM(S): 25 TABLET, FILM COATED ORAL at 06:32

## 2018-09-27 RX ADMIN — CARVEDILOL PHOSPHATE 6.25 MILLIGRAM(S): 80 CAPSULE, EXTENDED RELEASE ORAL at 17:09

## 2018-09-27 RX ADMIN — Medication 12.5 MILLIGRAM(S): at 06:31

## 2018-09-27 RX ADMIN — PANTOPRAZOLE SODIUM 40 MILLIGRAM(S): 20 TABLET, DELAYED RELEASE ORAL at 06:31

## 2018-09-27 RX ADMIN — Medication 100 MILLIEQUIVALENT(S): at 10:07

## 2018-09-27 RX ADMIN — Medication 100 MILLIEQUIVALENT(S): at 13:08

## 2018-09-27 NOTE — CONSULT NOTE ADULT - SUBJECTIVE AND OBJECTIVE BOX
Patient is a 48y old  Male who presents with a chief complaint of Umbilical Pain (26 Sep 2018 16:13)      HPI:  48M h/o HTN, EtOH Abuse, Cirrhosis x 2.5 years, presented to ER with abdominal pain x 3 days, seen by surgery for possible incarcerated umbilical hernia.  CT A/P showed cirrhosis with significant ascites.  Per surgery, no incarcerated bowel.  Pt admits to daily EtOH use since age 18.  States he was diagnosed with cirrhosis in [facility] 2.5y ago, but has not followed up.  Noted worsening edema and that his umbilical hernia was not reducible.  Last BM ~ 5 days ago.  No nausea / vomiting.  Denies SOB.  No additional complaints. Pt. was Dxed with alcoholic cirrhosis roughly two and a half years ago and is still drinking roughly two drinks daily. He states that he had an EGD done two and a half years ago when he was Dxed with cirrhosis which was negative and has had no subsequent EGD's He also has leg swelling and takes only BP meds. No GI bleeding history.    FAMILY HISTORY: reviewed.  No history of liver disease in mother, father.   SOCIAL HISTORY: + alcohol, - IVDA, - smoking.  REVIEW OF SYSTEMS: General: - fatigue, - weight loss, - fevers, - chills.  HEENT: - headache, - hearing disturbances.  EYES: - visual disturbances, - diplopia.  CARDIOVASCULAR: - chest pain, - palpitations.  PULMONARY: - SOB, - cough, - hemoptysis.  GI: + abdominal pain, - nausea, - vomiting, - diarrhea, - constipation.  : - urinary urgency, - frequency, - dysuria.  MUSCULOSKELETAL: - arthralgias, - myalgias.  NEURO:  - weakness, - numbness.  PSYCH: - depression, - anxiety, - suicidal thoughts. SKIN: - rashes, - lesions. + bilateral leg swelling  All remaining ROS are negative (26 Sep 2018 16:13)        PAST MEDICAL & SURGICAL HISTORY:  Hypertension  Alcoholic cirrhosis  Alcoholism  No significant past surgical history      FAMILY HISTORY:  No pertinent family history in first degree relatives      SOCIAL HISTORY:  Smoking Status: [ ] Current, [x ] Former, [ ] Never  Pack Years: Stopped smoking over twenty years ago, + ETOH, - drug use history.    MEDICATIONS:  MEDICATIONS  (STANDING):  metoprolol tartrate 12.5 milliGRAM(s) Oral every 12 hours  pantoprazole    Tablet 40 milliGRAM(s) Oral before breakfast  spironolactone 25 milliGRAM(s) Oral daily    MEDICATIONS  (PRN):  acetaminophen  IVPB .. 1000 milliGRAM(s) IV Intermittent once PRN Mild Pain (1 - 3)  HYDROmorphone  Injectable 0.5 milliGRAM(s) IV Push every 4 hours PRN Moderate Pain (4 - 6)  ondansetron Injectable 4 milliGRAM(s) IV Push every 6 hours PRN Nausea and/or Vomiting      Allergies    penicillin (Hives)    Intolerances        Vital Signs Last 24 Hrs  T(C): 36.9 (27 Sep 2018 08:04), Max: 37 (26 Sep 2018 12:04)  T(F): 98.5 (27 Sep 2018 08:04), Max: 98.6 (26 Sep 2018 12:04)  HR: 67 (27 Sep 2018 08:04) (67 - 105)  BP: 101/62 (27 Sep 2018 08:04) (101/62 - 125/78)  BP(mean): --  RR: 18 (27 Sep 2018 08:04) (18 - 20)  SpO2: 99% (27 Sep 2018 08:04) (95% - 99%)    09-26 @ 07:01  -  09-27 @ 07:00  --------------------------------------------------------  IN: 0 mL / OUT: 200 mL / NET: -200 mL          PHYSICAL EXAM:    General: Well developed; well nourished; in no acute distress  HEENT: MMM, conjunctiva pink and sclera anicteric.  Lungs: Clear bilaterally  Cor: RRR S1, S2 only  Gastrointestinal: Distended with a large tender, non reducibile umbilical hernia, + shifting dullness, + hepatosplenomegaly., no other abdominal tenderness illicited.  MARIA ESTHER: Pt. refused  Extremities: 3 to 4+ pitting edema of both lower extremities up to thigh.  Neurological: Alert and oriented x3  Skin: Warm and dry. No obvious rash      LABS:                        9.3    4.9   )-----------( 220      ( 27 Sep 2018 05:51 )             27.4     09-27    132<L>  |  95<L>  |  9.0  ----------------------------<  99  3.1<L>   |  29.0  |  0.62    Ca    6.8<L>      27 Sep 2018 05:51    TPro  4.7<L>  /  Alb  1.6<L>  /  TBili  1.7  /  DBili  x   /  AST  52<H>  /  ALT  23  /  AlkPhos  89  09-27          RADIOLOGY & ADDITIONAL STUDIES:   CT of abomen and pelvis results noted. + cirrhosis with fatty infiltration and large paraesophageal varices.

## 2018-09-27 NOTE — BRIEF OPERATIVE NOTE - PROCEDURE
<<-----Click on this checkbox to enter Procedure Abdominal paracentesis with ultrasound guidance  09/27/2018    Active  ELIZABETH

## 2018-09-27 NOTE — CONSULT NOTE ADULT - PROBLEM SELECTOR RECOMMENDATION 9
Rule out SBP although pt's c/o abdominal pain likely secondary to umbilical hernia. Would proceed with large volume paracentesis today and cover pt. empirically for SBP with IV Rocephin pending ascitic fluid cell count results. Pt will likely require long term diuretics and eventual EGD to evaluate for esophageal varices. Would start Coreg 6.25 mg. BID for variceal bleed prophylaxsis. AFP also ordered. Eventual MRI of Liver. Repeat labs ordered for the AM.

## 2018-09-28 LAB
ALBUMIN FLD-MCNC: 0.3 G/DL — SIGNIFICANT CHANGE UP
ALBUMIN SERPL ELPH-MCNC: 1.2 G/DL — LOW (ref 3.3–5.2)
ALP SERPL-CCNC: 93 U/L — SIGNIFICANT CHANGE UP (ref 40–120)
ALT FLD-CCNC: 20 U/L — SIGNIFICANT CHANGE UP
ANION GAP SERPL CALC-SCNC: 8 MMOL/L — SIGNIFICANT CHANGE UP (ref 5–17)
ANISOCYTOSIS BLD QL: SLIGHT — SIGNIFICANT CHANGE UP
AST SERPL-CCNC: 52 U/L — HIGH
BILIRUB SERPL-MCNC: 1.1 MG/DL — SIGNIFICANT CHANGE UP (ref 0.4–2)
BUN SERPL-MCNC: 12 MG/DL — SIGNIFICANT CHANGE UP (ref 8–20)
CALCIUM SERPL-MCNC: 6.7 MG/DL — LOW (ref 8.6–10.2)
CHLORIDE SERPL-SCNC: 98 MMOL/L — SIGNIFICANT CHANGE UP (ref 98–107)
CO2 SERPL-SCNC: 28 MMOL/L — SIGNIFICANT CHANGE UP (ref 22–29)
CREAT SERPL-MCNC: 0.63 MG/DL — SIGNIFICANT CHANGE UP (ref 0.5–1.3)
EOSINOPHIL NFR BLD AUTO: 2 % — SIGNIFICANT CHANGE UP (ref 0–6)
GLUCOSE FLD-MCNC: 99 MG/DL — SIGNIFICANT CHANGE UP
GLUCOSE SERPL-MCNC: 112 MG/DL — SIGNIFICANT CHANGE UP (ref 70–115)
HCT VFR BLD CALC: 28.8 % — LOW (ref 42–52)
HGB BLD-MCNC: 9.3 G/DL — LOW (ref 14–18)
HYPOCHROMIA BLD QL: SLIGHT — SIGNIFICANT CHANGE UP
INR BLD: 1.27 RATIO — HIGH (ref 0.88–1.16)
LDH SERPL L TO P-CCNC: 60 U/L — SIGNIFICANT CHANGE UP
LYMPHOCYTES # BLD AUTO: 1 % — LOW (ref 20–55)
MACROCYTES BLD QL: SLIGHT — SIGNIFICANT CHANGE UP
MCHC RBC-ENTMCNC: 25 PG — LOW (ref 27–31)
MCHC RBC-ENTMCNC: 32.3 G/DL — SIGNIFICANT CHANGE UP (ref 32–36)
MCV RBC AUTO: 77.4 FL — LOW (ref 80–94)
MICROCYTES BLD QL: SLIGHT — SIGNIFICANT CHANGE UP
MONOCYTES NFR BLD AUTO: 8 % — SIGNIFICANT CHANGE UP (ref 3–10)
MYELOCYTES NFR BLD: 1 % — HIGH (ref 0–0)
NEUTROPHILS NFR BLD AUTO: 88 % — HIGH (ref 37–73)
NIGHT BLUE STAIN TISS: SIGNIFICANT CHANGE UP
PLAT MORPH BLD: NORMAL — SIGNIFICANT CHANGE UP
PLATELET # BLD AUTO: 209 K/UL — SIGNIFICANT CHANGE UP (ref 150–400)
POIKILOCYTOSIS BLD QL AUTO: SLIGHT — SIGNIFICANT CHANGE UP
POTASSIUM SERPL-MCNC: 3 MMOL/L — LOW (ref 3.5–5.3)
POTASSIUM SERPL-SCNC: 3 MMOL/L — LOW (ref 3.5–5.3)
PROT FLD-MCNC: <1 G/DL — SIGNIFICANT CHANGE UP
PROT SERPL-MCNC: 4.5 G/DL — LOW (ref 6.6–8.7)
PROTHROM AB SERPL-ACNC: 14 SEC — HIGH (ref 9.8–12.7)
RBC # BLD: 3.72 M/UL — LOW (ref 4.6–6.2)
RBC # FLD: 17.6 % — HIGH (ref 11–15.6)
RBC BLD AUTO: ABNORMAL
SODIUM SERPL-SCNC: 134 MMOL/L — LOW (ref 135–145)
SPECIMEN SOURCE: SIGNIFICANT CHANGE UP
WBC # BLD: 3.6 K/UL — LOW (ref 4.8–10.8)
WBC # FLD AUTO: 3.6 K/UL — LOW (ref 4.8–10.8)

## 2018-09-28 PROCEDURE — 99232 SBSQ HOSP IP/OBS MODERATE 35: CPT

## 2018-09-28 PROCEDURE — 99233 SBSQ HOSP IP/OBS HIGH 50: CPT

## 2018-09-28 RX ORDER — POTASSIUM CHLORIDE 20 MEQ
40 PACKET (EA) ORAL EVERY 4 HOURS
Qty: 0 | Refills: 0 | Status: COMPLETED | OUTPATIENT
Start: 2018-09-28 | End: 2018-09-28

## 2018-09-28 RX ORDER — POTASSIUM CHLORIDE 20 MEQ
10 PACKET (EA) ORAL
Qty: 0 | Refills: 0 | Status: COMPLETED | OUTPATIENT
Start: 2018-09-28 | End: 2018-09-28

## 2018-09-28 RX ADMIN — Medication 100 MILLIEQUIVALENT(S): at 14:05

## 2018-09-28 RX ADMIN — Medication 40 MILLIEQUIVALENT(S): at 18:17

## 2018-09-28 RX ADMIN — CARVEDILOL PHOSPHATE 6.25 MILLIGRAM(S): 80 CAPSULE, EXTENDED RELEASE ORAL at 18:17

## 2018-09-28 RX ADMIN — Medication 100 MILLIEQUIVALENT(S): at 16:28

## 2018-09-28 RX ADMIN — CARVEDILOL PHOSPHATE 6.25 MILLIGRAM(S): 80 CAPSULE, EXTENDED RELEASE ORAL at 05:47

## 2018-09-28 RX ADMIN — Medication 40 MILLIEQUIVALENT(S): at 21:24

## 2018-09-28 RX ADMIN — SPIRONOLACTONE 100 MILLIGRAM(S): 25 TABLET, FILM COATED ORAL at 05:47

## 2018-09-28 RX ADMIN — PANTOPRAZOLE SODIUM 40 MILLIGRAM(S): 20 TABLET, DELAYED RELEASE ORAL at 05:47

## 2018-09-28 RX ADMIN — Medication 40 MILLIEQUIVALENT(S): at 14:04

## 2018-09-28 NOTE — PROGRESS NOTE ADULT - ASSESSMENT
> Cirrhosis with portal hypertension and ascites - likely secondary to ETOH abuse.  s/p paracentesis.  On Aldactone, Coreg.  MRI with and without contrast to assess for liver lesions.  > Hypokalemia - supplement lytes.   > HTN - Continue BBlocker.  BP stable.   > Paraesophageal varices - No s/s of bleeding.  PPI.  > DVT Prophylaxis - Lower extremity intermittent compression devices.

## 2018-09-28 NOTE — PROGRESS NOTE ADULT - ASSESSMENT
Decompensated alcoholic cirrhosis with ascites and leg edema and  anasarca.  Tap negative for SBP.  Kcl low, supplemented.  Aldactone increased to 100 mg po qd.  Once K has normalized would add lasix at 40 mg po qd.  Follow weights and labs.  Eventual EGD to exclude varices.     Needs absolute alcohol abstention.  AA referral.

## 2018-09-29 LAB
ALBUMIN SERPL ELPH-MCNC: 1.3 G/DL — LOW (ref 3.3–5.2)
ALP SERPL-CCNC: 88 U/L — SIGNIFICANT CHANGE UP (ref 40–120)
ALT FLD-CCNC: 21 U/L — SIGNIFICANT CHANGE UP
ANION GAP SERPL CALC-SCNC: 8 MMOL/L — SIGNIFICANT CHANGE UP (ref 5–17)
AST SERPL-CCNC: 52 U/L — HIGH
BILIRUB SERPL-MCNC: 1.1 MG/DL — SIGNIFICANT CHANGE UP (ref 0.4–2)
BUN SERPL-MCNC: 9 MG/DL — SIGNIFICANT CHANGE UP (ref 8–20)
CALCIUM SERPL-MCNC: 6.8 MG/DL — LOW (ref 8.6–10.2)
CHLORIDE SERPL-SCNC: 99 MMOL/L — SIGNIFICANT CHANGE UP (ref 98–107)
CO2 SERPL-SCNC: 27 MMOL/L — SIGNIFICANT CHANGE UP (ref 22–29)
CREAT SERPL-MCNC: 0.65 MG/DL — SIGNIFICANT CHANGE UP (ref 0.5–1.3)
GLUCOSE SERPL-MCNC: 103 MG/DL — SIGNIFICANT CHANGE UP (ref 70–115)
HCT VFR BLD CALC: 28.2 % — LOW (ref 42–52)
HGB BLD-MCNC: 9 G/DL — LOW (ref 14–18)
MCHC RBC-ENTMCNC: 24.9 PG — LOW (ref 27–31)
MCHC RBC-ENTMCNC: 31.9 G/DL — LOW (ref 32–36)
MCV RBC AUTO: 78.1 FL — LOW (ref 80–94)
PLATELET # BLD AUTO: 187 K/UL — SIGNIFICANT CHANGE UP (ref 150–400)
POTASSIUM SERPL-MCNC: 3.8 MMOL/L — SIGNIFICANT CHANGE UP (ref 3.5–5.3)
POTASSIUM SERPL-SCNC: 3.8 MMOL/L — SIGNIFICANT CHANGE UP (ref 3.5–5.3)
PROT SERPL-MCNC: 4.5 G/DL — LOW (ref 6.6–8.7)
RBC # BLD: 3.61 M/UL — LOW (ref 4.6–6.2)
RBC # FLD: 17.6 % — HIGH (ref 11–15.6)
SODIUM SERPL-SCNC: 134 MMOL/L — LOW (ref 135–145)
WBC # BLD: 3.4 K/UL — LOW (ref 4.8–10.8)
WBC # FLD AUTO: 3.4 K/UL — LOW (ref 4.8–10.8)

## 2018-09-29 PROCEDURE — 99233 SBSQ HOSP IP/OBS HIGH 50: CPT

## 2018-09-29 PROCEDURE — 99232 SBSQ HOSP IP/OBS MODERATE 35: CPT

## 2018-09-29 RX ORDER — POTASSIUM CHLORIDE 20 MEQ
40 PACKET (EA) ORAL DAILY
Qty: 0 | Refills: 0 | Status: DISCONTINUED | OUTPATIENT
Start: 2018-09-29 | End: 2018-10-03

## 2018-09-29 RX ORDER — FUROSEMIDE 40 MG
40 TABLET ORAL DAILY
Qty: 0 | Refills: 0 | Status: DISCONTINUED | OUTPATIENT
Start: 2018-09-29 | End: 2018-10-01

## 2018-09-29 RX ADMIN — SPIRONOLACTONE 100 MILLIGRAM(S): 25 TABLET, FILM COATED ORAL at 05:51

## 2018-09-29 RX ADMIN — Medication 40 MILLIGRAM(S): at 12:26

## 2018-09-29 RX ADMIN — CARVEDILOL PHOSPHATE 6.25 MILLIGRAM(S): 80 CAPSULE, EXTENDED RELEASE ORAL at 05:51

## 2018-09-29 RX ADMIN — PANTOPRAZOLE SODIUM 40 MILLIGRAM(S): 20 TABLET, DELAYED RELEASE ORAL at 05:51

## 2018-09-29 RX ADMIN — Medication 40 MILLIEQUIVALENT(S): at 12:26

## 2018-09-29 RX ADMIN — CARVEDILOL PHOSPHATE 6.25 MILLIGRAM(S): 80 CAPSULE, EXTENDED RELEASE ORAL at 17:56

## 2018-09-29 NOTE — PROGRESS NOTE ADULT - ASSESSMENT
Decompensated cirrhosis with ascites/ anasarca.  K finally normal.  Tap was negative for SBP.    Serum albumin very low at 1.3.    Will add lasix 40 mg po qd and some extra potassium.  Follow lytes and weights.  Na restriction.    Eventual alcohol rehab.

## 2018-09-29 NOTE — PROGRESS NOTE ADULT - ASSESSMENT
> Cirrhosis with portal hypertension and ascites; Generalized anasarca - likely secondary to ETOH abuse.  s/p paracentesis.  On Aldactone, Coreg.  MRI with and without contrast to assess for liver lesions.  Addition of Lasix for diuresis.  GI followup appreciated.   > Hypokalemia - supplement lytes.   > HTN - Continue BBlocker.  BP stable.   > Paraesophageal varices - No s/s of bleeding.  PPI.  > DVT Prophylaxis - Lower extremity intermittent compression devices.

## 2018-09-30 LAB
ANION GAP SERPL CALC-SCNC: 10 MMOL/L — SIGNIFICANT CHANGE UP (ref 5–17)
BUN SERPL-MCNC: 9 MG/DL — SIGNIFICANT CHANGE UP (ref 8–20)
CALCIUM SERPL-MCNC: 6.6 MG/DL — LOW (ref 8.6–10.2)
CHLORIDE SERPL-SCNC: 102 MMOL/L — SIGNIFICANT CHANGE UP (ref 98–107)
CO2 SERPL-SCNC: 25 MMOL/L — SIGNIFICANT CHANGE UP (ref 22–29)
CREAT SERPL-MCNC: 0.54 MG/DL — SIGNIFICANT CHANGE UP (ref 0.5–1.3)
GLUCOSE SERPL-MCNC: 102 MG/DL — SIGNIFICANT CHANGE UP (ref 70–115)
POTASSIUM SERPL-MCNC: 3.9 MMOL/L — SIGNIFICANT CHANGE UP (ref 3.5–5.3)
POTASSIUM SERPL-SCNC: 3.9 MMOL/L — SIGNIFICANT CHANGE UP (ref 3.5–5.3)
SODIUM SERPL-SCNC: 137 MMOL/L — SIGNIFICANT CHANGE UP (ref 135–145)

## 2018-09-30 PROCEDURE — 74183 MRI ABD W/O CNTR FLWD CNTR: CPT | Mod: 26

## 2018-09-30 PROCEDURE — 99233 SBSQ HOSP IP/OBS HIGH 50: CPT

## 2018-09-30 PROCEDURE — 99232 SBSQ HOSP IP/OBS MODERATE 35: CPT

## 2018-09-30 RX ADMIN — Medication 40 MILLIGRAM(S): at 06:28

## 2018-09-30 RX ADMIN — CARVEDILOL PHOSPHATE 6.25 MILLIGRAM(S): 80 CAPSULE, EXTENDED RELEASE ORAL at 17:31

## 2018-09-30 RX ADMIN — SPIRONOLACTONE 100 MILLIGRAM(S): 25 TABLET, FILM COATED ORAL at 06:28

## 2018-09-30 RX ADMIN — Medication 40 MILLIEQUIVALENT(S): at 11:11

## 2018-09-30 RX ADMIN — PANTOPRAZOLE SODIUM 40 MILLIGRAM(S): 20 TABLET, DELAYED RELEASE ORAL at 06:28

## 2018-09-30 RX ADMIN — CARVEDILOL PHOSPHATE 6.25 MILLIGRAM(S): 80 CAPSULE, EXTENDED RELEASE ORAL at 06:28

## 2018-09-30 NOTE — PROGRESS NOTE ADULT - ASSESSMENT
Decompensated alcoholic cirrhosis.  Anasarca.  Severe Hypoalbuminemia.  No obvious renal disease.  Normal renal function.  Current K at 3.9.  Adequate with current diuretics.  Adequate diuresis.  Continue low sodium diet and monitoring or renal function.

## 2018-09-30 NOTE — PROGRESS NOTE ADULT - ASSESSMENT
> Cirrhosis with portal hypertension and ascites; Generalized anasarca - likely secondary to ETOH abuse.  s/p paracentesis.  On Aldactone, Coreg.  Lasix added.  MRI with and without contrast to assess for liver lesions.  GI followup appreciated.   > Hypokalemia - K now stable.  Monitor BMP.    > HTN - Continue BBlocker.  BP stable.   > Paraesophageal varices - No s/s of bleeding.  PPI.  ? role for EGD per GI.    > DVT Prophylaxis - Lower extremity intermittent compression devices.

## 2018-10-01 LAB
ALBUMIN SERPL ELPH-MCNC: 1.5 G/DL — LOW (ref 3.3–5.2)
ALP SERPL-CCNC: 88 U/L — SIGNIFICANT CHANGE UP (ref 40–120)
ALT FLD-CCNC: 24 U/L — SIGNIFICANT CHANGE UP
ANION GAP SERPL CALC-SCNC: 8 MMOL/L — SIGNIFICANT CHANGE UP (ref 5–17)
AST SERPL-CCNC: 59 U/L — HIGH
BILIRUB SERPL-MCNC: 1 MG/DL — SIGNIFICANT CHANGE UP (ref 0.4–2)
BUN SERPL-MCNC: 10 MG/DL — SIGNIFICANT CHANGE UP (ref 8–20)
CALCIUM SERPL-MCNC: 6.9 MG/DL — LOW (ref 8.6–10.2)
CHLORIDE SERPL-SCNC: 102 MMOL/L — SIGNIFICANT CHANGE UP (ref 98–107)
CO2 SERPL-SCNC: 24 MMOL/L — SIGNIFICANT CHANGE UP (ref 22–29)
CREAT SERPL-MCNC: 0.68 MG/DL — SIGNIFICANT CHANGE UP (ref 0.5–1.3)
FERRITIN SERPL-MCNC: 68 NG/ML — SIGNIFICANT CHANGE UP (ref 30–400)
GLUCOSE SERPL-MCNC: 105 MG/DL — SIGNIFICANT CHANGE UP (ref 70–115)
HCT VFR BLD CALC: 28.1 % — LOW (ref 42–52)
HGB BLD-MCNC: 9.4 G/DL — LOW (ref 14–18)
IRON SATN MFR SERPL: 10 % — LOW (ref 16–55)
IRON SATN MFR SERPL: 23 UG/DL — LOW (ref 59–158)
MCHC RBC-ENTMCNC: 25 PG — LOW (ref 27–31)
MCHC RBC-ENTMCNC: 33.5 G/DL — SIGNIFICANT CHANGE UP (ref 32–36)
MCV RBC AUTO: 74.7 FL — LOW (ref 80–94)
PLATELET # BLD AUTO: 243 K/UL — SIGNIFICANT CHANGE UP (ref 150–400)
POTASSIUM SERPL-MCNC: 3.7 MMOL/L — SIGNIFICANT CHANGE UP (ref 3.5–5.3)
POTASSIUM SERPL-SCNC: 3.7 MMOL/L — SIGNIFICANT CHANGE UP (ref 3.5–5.3)
PROT SERPL-MCNC: 4.9 G/DL — LOW (ref 6.6–8.7)
RBC # BLD: 3.76 M/UL — LOW (ref 4.6–6.2)
RBC # FLD: 17.3 % — HIGH (ref 11–15.6)
SODIUM SERPL-SCNC: 134 MMOL/L — LOW (ref 135–145)
TIBC SERPL-MCNC: 233 UG/DL — SIGNIFICANT CHANGE UP (ref 220–430)
TRANSFERRIN SERPL-MCNC: 163 MG/DL — LOW (ref 180–329)
WBC # BLD: 4.2 K/UL — LOW (ref 4.8–10.8)
WBC # FLD AUTO: 4.2 K/UL — LOW (ref 4.8–10.8)

## 2018-10-01 PROCEDURE — 99233 SBSQ HOSP IP/OBS HIGH 50: CPT

## 2018-10-01 PROCEDURE — 99232 SBSQ HOSP IP/OBS MODERATE 35: CPT

## 2018-10-01 RX ORDER — SPIRONOLACTONE 25 MG/1
200 TABLET, FILM COATED ORAL DAILY
Qty: 0 | Refills: 0 | Status: DISCONTINUED | OUTPATIENT
Start: 2018-10-01 | End: 2018-10-03

## 2018-10-01 RX ORDER — FUROSEMIDE 40 MG
60 TABLET ORAL DAILY
Qty: 0 | Refills: 0 | Status: DISCONTINUED | OUTPATIENT
Start: 2018-10-01 | End: 2018-10-02

## 2018-10-01 RX ADMIN — Medication 60 MILLIGRAM(S): at 17:47

## 2018-10-01 RX ADMIN — Medication 40 MILLIGRAM(S): at 05:20

## 2018-10-01 RX ADMIN — SPIRONOLACTONE 200 MILLIGRAM(S): 25 TABLET, FILM COATED ORAL at 17:47

## 2018-10-01 RX ADMIN — SPIRONOLACTONE 100 MILLIGRAM(S): 25 TABLET, FILM COATED ORAL at 05:20

## 2018-10-01 RX ADMIN — Medication 40 MILLIEQUIVALENT(S): at 11:54

## 2018-10-01 RX ADMIN — CARVEDILOL PHOSPHATE 6.25 MILLIGRAM(S): 80 CAPSULE, EXTENDED RELEASE ORAL at 17:47

## 2018-10-01 RX ADMIN — PANTOPRAZOLE SODIUM 40 MILLIGRAM(S): 20 TABLET, DELAYED RELEASE ORAL at 05:20

## 2018-10-01 RX ADMIN — CARVEDILOL PHOSPHATE 6.25 MILLIGRAM(S): 80 CAPSULE, EXTENDED RELEASE ORAL at 05:20

## 2018-10-01 NOTE — PROGRESS NOTE ADULT - ASSESSMENT
Decompensated alcoholic cirrhosis.  Weight going back up.  Alleges compliance with salt restriction.  No HE or Bleeding  Reviewed results of MRI with pt.      Will plan for EGD in AM to screen for esophageal varices.  Possible banding.  RPBP explained to pt who understands and agrees to proceed.  ASA #3. Arrangements made.   Will increase doses of diuretics to Aldactone 200 mg po qd and Lasix 60 mg po qd.  Monitor weights.  Check ua for protein.  Follow lytes.   Follow daily weights. Decompensated alcoholic cirrhosis.  Weight going back up.  Alleges compliance with salt restriction.  No HE or Bleeding  Reviewed results of MRI with pt.      Will plan for EGD in AM to screen for esophageal varices.  Possible banding.  RPBP explained to pt who understands and agrees to proceed.  ASA #3. Arrangements made.   Ordered autoimmune serologies to exclude autoimmune diseases as source for cirrhosis.  Will need eventual referral for Liver transplant evaluation.    Will increase doses of diuretics to Aldactone 200 mg po qd and Lasix 60 mg po qd.  Monitor weights.  Check ua for protein.  Follow lytes.   Follow daily weights.

## 2018-10-01 NOTE — PROGRESS NOTE ADULT - ASSESSMENT
> Cirrhosis with portal hypertension and ascites; Generalized anasarca - likely secondary to ETOH abuse.  s/p paracentesis.  On Aldactone, Coreg.  Lasix added.  MRI with and without contrast to assess for liver lesions.  GI followup appreciated.   > Paraesophageal varices - No s/s of bleeding.  PPI.  EGD in am per GI.    > Hypokalemia - K now stable.  Monitor BMP.    > HTN - Continue BBlocker.  BP stable.   > DVT Prophylaxis - Lower extremity intermittent compression devices.

## 2018-10-02 ENCOUNTER — TRANSCRIPTION ENCOUNTER (OUTPATIENT)
Age: 48
End: 2018-10-02

## 2018-10-02 LAB
ANION GAP SERPL CALC-SCNC: 7 MMOL/L — SIGNIFICANT CHANGE UP (ref 5–17)
APPEARANCE UR: CLEAR — SIGNIFICANT CHANGE UP
BILIRUB UR-MCNC: NEGATIVE — SIGNIFICANT CHANGE UP
BUN SERPL-MCNC: 10 MG/DL — SIGNIFICANT CHANGE UP (ref 8–20)
CALCIUM SERPL-MCNC: 7 MG/DL — LOW (ref 8.6–10.2)
CERULOPLASMIN SERPL-MCNC: 5 MG/DL — LOW (ref 20–60)
CHLORIDE SERPL-SCNC: 103 MMOL/L — SIGNIFICANT CHANGE UP (ref 98–107)
CO2 SERPL-SCNC: 24 MMOL/L — SIGNIFICANT CHANGE UP (ref 22–29)
COLOR SPEC: YELLOW — SIGNIFICANT CHANGE UP
CREAT SERPL-MCNC: 0.76 MG/DL — SIGNIFICANT CHANGE UP (ref 0.5–1.3)
CULTURE RESULTS: SIGNIFICANT CHANGE UP
DIFF PNL FLD: NEGATIVE — SIGNIFICANT CHANGE UP
GLUCOSE SERPL-MCNC: 103 MG/DL — SIGNIFICANT CHANGE UP (ref 70–115)
GLUCOSE UR QL: NEGATIVE MG/DL — SIGNIFICANT CHANGE UP
KETONES UR-MCNC: NEGATIVE — SIGNIFICANT CHANGE UP
LEUKOCYTE ESTERASE UR-ACNC: NEGATIVE — SIGNIFICANT CHANGE UP
MAGNESIUM SERPL-MCNC: 1.7 MG/DL — SIGNIFICANT CHANGE UP (ref 1.6–2.6)
NITRITE UR-MCNC: NEGATIVE — SIGNIFICANT CHANGE UP
PH UR: 8 — SIGNIFICANT CHANGE UP (ref 5–8)
POTASSIUM SERPL-MCNC: 4 MMOL/L — SIGNIFICANT CHANGE UP (ref 3.5–5.3)
POTASSIUM SERPL-SCNC: 4 MMOL/L — SIGNIFICANT CHANGE UP (ref 3.5–5.3)
PROT UR-MCNC: NEGATIVE MG/DL — SIGNIFICANT CHANGE UP
SODIUM SERPL-SCNC: 134 MMOL/L — LOW (ref 135–145)
SP GR SPEC: 1.01 — SIGNIFICANT CHANGE UP (ref 1.01–1.02)
SPECIMEN SOURCE: SIGNIFICANT CHANGE UP
UROBILINOGEN FLD QL: 1 MG/DL

## 2018-10-02 PROCEDURE — 99233 SBSQ HOSP IP/OBS HIGH 50: CPT

## 2018-10-02 PROCEDURE — 99232 SBSQ HOSP IP/OBS MODERATE 35: CPT

## 2018-10-02 RX ORDER — FUROSEMIDE 40 MG
60 TABLET ORAL DAILY
Qty: 0 | Refills: 0 | Status: DISCONTINUED | OUTPATIENT
Start: 2018-10-02 | End: 2018-10-03

## 2018-10-02 RX ADMIN — CARVEDILOL PHOSPHATE 6.25 MILLIGRAM(S): 80 CAPSULE, EXTENDED RELEASE ORAL at 05:44

## 2018-10-02 RX ADMIN — SPIRONOLACTONE 200 MILLIGRAM(S): 25 TABLET, FILM COATED ORAL at 05:44

## 2018-10-02 RX ADMIN — PANTOPRAZOLE SODIUM 40 MILLIGRAM(S): 20 TABLET, DELAYED RELEASE ORAL at 05:44

## 2018-10-02 RX ADMIN — Medication 40 MILLIEQUIVALENT(S): at 12:22

## 2018-10-02 RX ADMIN — Medication 60 MILLIGRAM(S): at 05:44

## 2018-10-02 RX ADMIN — CARVEDILOL PHOSPHATE 6.25 MILLIGRAM(S): 80 CAPSULE, EXTENDED RELEASE ORAL at 17:29

## 2018-10-02 NOTE — DIETITIAN INITIAL EVALUATION ADULT. - ADHERENCE
fair/Pt reports not liking sweets, doesn't add salt to foods; eats high protein, good vegetable intake via diet recall

## 2018-10-02 NOTE — DIETITIAN INITIAL EVALUATION ADULT. - PERTINENT LABORATORY DATA
10-02 Na134 mmol/L<L> Glu 103 mg/dL K+ 4.0 mmol/L Cr  0.76 mg/dL BUN 10.0 mg/dL Phos n/a   Alb n/a   PAB n/a   No A1c reported

## 2018-10-02 NOTE — PROGRESS NOTE ADULT - ASSESSMENT
Decompensated alcoholic cirrhosis.  Moderate ascites.  Tap was negative.  Still not losing weight despite increase in the Diuretics.    Will Switch the PO  Lasix to IVP.  Needs salt restriction.  EGD tomorrow to screen for varices.  Repeat AM BW.  Arrangements made. ASA #2.

## 2018-10-02 NOTE — PROGRESS NOTE ADULT - ASSESSMENT
> Cirrhosis with portal hypertension and ascites; Generalized anasarca - likely secondary to ETOH abuse, additional workup per GI for secondary etiologies.  Hepatitis Panel ordered.  On Aldactone, Coreg.  Lasix added.  MRI with and without contrast to assess for liver lesions.  GI followup appreciated.  I d/w patient his Child's Score -9, indicating Class B cirrhosis.  Emphasized elevated mortality rate, need for close medical followup, evaluation at liver transplant center.  He acknowledged understanding.    > Paraesophageal varices - No s/s of bleeding.  PPI.  EGD in am per GI.    > Hypokalemia - K now stable.  Monitor BMP.    > HTN - Continue BBlocker.  BP stable.   > DVT Prophylaxis - Lower extremity intermittent compression devices.

## 2018-10-02 NOTE — DIETITIAN INITIAL EVALUATION ADULT. - OTHER INFO
Pt admit with liver cirrhosis with ascites secondary to ETOH use, s/p paracentesis 9/27 2liters fluid taken, also on Lasix. Pt reports good appetite/PO now and PTA. Pt states choosing healthy foods at home and states wanting to lose weight. Nutrition education offered and declined at this time. Pt was provided verbal education about the importance of daily weights, pt was receptive and understanding. RD to remain available.

## 2018-10-03 ENCOUNTER — TRANSCRIPTION ENCOUNTER (OUTPATIENT)
Age: 48
End: 2018-10-03

## 2018-10-03 ENCOUNTER — RESULT REVIEW (OUTPATIENT)
Age: 48
End: 2018-10-03

## 2018-10-03 VITALS
HEART RATE: 84 BPM | RESPIRATION RATE: 18 BRPM | TEMPERATURE: 99 F | DIASTOLIC BLOOD PRESSURE: 69 MMHG | SYSTOLIC BLOOD PRESSURE: 111 MMHG

## 2018-10-03 LAB
ALBUMIN SERPL ELPH-MCNC: 1.6 G/DL — LOW (ref 3.3–5.2)
ALP SERPL-CCNC: 82 U/L — SIGNIFICANT CHANGE UP (ref 40–120)
ALT FLD-CCNC: 24 U/L — SIGNIFICANT CHANGE UP
ANA TITR SER: NEGATIVE — SIGNIFICANT CHANGE UP
ANION GAP SERPL CALC-SCNC: 8 MMOL/L — SIGNIFICANT CHANGE UP (ref 5–17)
AST SERPL-CCNC: 59 U/L — HIGH
BILIRUB SERPL-MCNC: 0.8 MG/DL — SIGNIFICANT CHANGE UP (ref 0.4–2)
BUN SERPL-MCNC: 11 MG/DL — SIGNIFICANT CHANGE UP (ref 8–20)
CALCIUM SERPL-MCNC: 7.3 MG/DL — LOW (ref 8.6–10.2)
CHLORIDE SERPL-SCNC: 103 MMOL/L — SIGNIFICANT CHANGE UP (ref 98–107)
CO2 SERPL-SCNC: 22 MMOL/L — SIGNIFICANT CHANGE UP (ref 22–29)
CREAT SERPL-MCNC: 0.67 MG/DL — SIGNIFICANT CHANGE UP (ref 0.5–1.3)
GLUCOSE SERPL-MCNC: 104 MG/DL — SIGNIFICANT CHANGE UP (ref 70–115)
HAV IGM SER-ACNC: SIGNIFICANT CHANGE UP
HBV CORE IGM SER-ACNC: SIGNIFICANT CHANGE UP
HBV SURFACE AB SER-ACNC: SIGNIFICANT CHANGE UP
HBV SURFACE AG SER-ACNC: SIGNIFICANT CHANGE UP
HCT VFR BLD CALC: 27.9 % — LOW (ref 42–52)
HCV AB S/CO SERPL IA: 0.16 S/CO — SIGNIFICANT CHANGE UP
HCV AB SERPL-IMP: SIGNIFICANT CHANGE UP
HGB BLD-MCNC: 9.2 G/DL — LOW (ref 14–18)
INR BLD: 1.18 RATIO — HIGH (ref 0.88–1.16)
MCHC RBC-ENTMCNC: 24.5 PG — LOW (ref 27–31)
MCHC RBC-ENTMCNC: 33 G/DL — SIGNIFICANT CHANGE UP (ref 32–36)
MCV RBC AUTO: 74.2 FL — LOW (ref 80–94)
MITOCHONDRIA AB SER-ACNC: SIGNIFICANT CHANGE UP
PLATELET # BLD AUTO: 237 K/UL — SIGNIFICANT CHANGE UP (ref 150–400)
POTASSIUM SERPL-MCNC: 3.9 MMOL/L — SIGNIFICANT CHANGE UP (ref 3.5–5.3)
POTASSIUM SERPL-SCNC: 3.9 MMOL/L — SIGNIFICANT CHANGE UP (ref 3.5–5.3)
PROT SERPL-MCNC: 5 G/DL — LOW (ref 6.6–8.7)
PROTHROM AB SERPL-ACNC: 13 SEC — HIGH (ref 9.8–12.7)
RBC # BLD: 3.76 M/UL — LOW (ref 4.6–6.2)
RBC # FLD: 17.1 % — HIGH (ref 11–15.6)
SMOOTH MUSCLE AB SER-ACNC: ABNORMAL
SODIUM SERPL-SCNC: 133 MMOL/L — LOW (ref 135–145)
WBC # BLD: 4 K/UL — LOW (ref 4.8–10.8)
WBC # FLD AUTO: 4 K/UL — LOW (ref 4.8–10.8)

## 2018-10-03 PROCEDURE — 87206 SMEAR FLUORESCENT/ACID STAI: CPT

## 2018-10-03 PROCEDURE — 43239 EGD BIOPSY SINGLE/MULTIPLE: CPT

## 2018-10-03 PROCEDURE — 84157 ASSAY OF PROTEIN OTHER: CPT

## 2018-10-03 PROCEDURE — 84466 ASSAY OF TRANSFERRIN: CPT

## 2018-10-03 PROCEDURE — 96374 THER/PROPH/DIAG INJ IV PUSH: CPT | Mod: XU

## 2018-10-03 PROCEDURE — 80074 ACUTE HEPATITIS PANEL: CPT

## 2018-10-03 PROCEDURE — 80048 BASIC METABOLIC PNL TOTAL CA: CPT

## 2018-10-03 PROCEDURE — 85610 PROTHROMBIN TIME: CPT

## 2018-10-03 PROCEDURE — 85027 COMPLETE CBC AUTOMATED: CPT

## 2018-10-03 PROCEDURE — 88305 TISSUE EXAM BY PATHOLOGIST: CPT | Mod: 26

## 2018-10-03 PROCEDURE — 83690 ASSAY OF LIPASE: CPT

## 2018-10-03 PROCEDURE — 89051 BODY FLUID CELL COUNT: CPT

## 2018-10-03 PROCEDURE — 88342 IMHCHEM/IMCYTCHM 1ST ANTB: CPT | Mod: 26

## 2018-10-03 PROCEDURE — 86376 MICROSOMAL ANTIBODY EACH: CPT

## 2018-10-03 PROCEDURE — 88342 IMHCHEM/IMCYTCHM 1ST ANTB: CPT

## 2018-10-03 PROCEDURE — 83605 ASSAY OF LACTIC ACID: CPT

## 2018-10-03 PROCEDURE — 83615 LACTATE (LD) (LDH) ENZYME: CPT

## 2018-10-03 PROCEDURE — 82390 ASSAY OF CERULOPLASMIN: CPT

## 2018-10-03 PROCEDURE — 86255 FLUORESCENT ANTIBODY SCREEN: CPT

## 2018-10-03 PROCEDURE — 93005 ELECTROCARDIOGRAM TRACING: CPT

## 2018-10-03 PROCEDURE — 86850 RBC ANTIBODY SCREEN: CPT

## 2018-10-03 PROCEDURE — 82945 GLUCOSE OTHER FLUID: CPT

## 2018-10-03 PROCEDURE — 86038 ANTINUCLEAR ANTIBODIES: CPT

## 2018-10-03 PROCEDURE — 82728 ASSAY OF FERRITIN: CPT

## 2018-10-03 PROCEDURE — 71045 X-RAY EXAM CHEST 1 VIEW: CPT

## 2018-10-03 PROCEDURE — 87350 HEPATITIS BE AG IA: CPT

## 2018-10-03 PROCEDURE — 86707 HEPATITIS BE ANTIBODY: CPT

## 2018-10-03 PROCEDURE — 86706 HEP B SURFACE ANTIBODY: CPT

## 2018-10-03 PROCEDURE — 99239 HOSP IP/OBS DSCHRG MGMT >30: CPT

## 2018-10-03 PROCEDURE — 86901 BLOOD TYPING SEROLOGIC RH(D): CPT

## 2018-10-03 PROCEDURE — 87070 CULTURE OTHR SPECIMN AEROBIC: CPT

## 2018-10-03 PROCEDURE — 80053 COMPREHEN METABOLIC PANEL: CPT

## 2018-10-03 PROCEDURE — 87116 MYCOBACTERIA CULTURE: CPT

## 2018-10-03 PROCEDURE — 87015 SPECIMEN INFECT AGNT CONCNTJ: CPT

## 2018-10-03 PROCEDURE — 83550 IRON BINDING TEST: CPT

## 2018-10-03 PROCEDURE — 87205 SMEAR GRAM STAIN: CPT

## 2018-10-03 PROCEDURE — 82525 ASSAY OF COPPER: CPT

## 2018-10-03 PROCEDURE — 86381 MITOCHONDRIAL ANTIBODY EACH: CPT

## 2018-10-03 PROCEDURE — 99285 EMERGENCY DEPT VISIT HI MDM: CPT | Mod: 25

## 2018-10-03 PROCEDURE — 83735 ASSAY OF MAGNESIUM: CPT

## 2018-10-03 PROCEDURE — 87075 CULTR BACTERIA EXCEPT BLOOD: CPT

## 2018-10-03 PROCEDURE — 74183 MRI ABD W/O CNTR FLWD CNTR: CPT

## 2018-10-03 PROCEDURE — 81003 URINALYSIS AUTO W/O SCOPE: CPT

## 2018-10-03 PROCEDURE — 74177 CT ABD & PELVIS W/CONTRAST: CPT

## 2018-10-03 PROCEDURE — 36415 COLL VENOUS BLD VENIPUNCTURE: CPT

## 2018-10-03 PROCEDURE — 85730 THROMBOPLASTIN TIME PARTIAL: CPT

## 2018-10-03 PROCEDURE — 88305 TISSUE EXAM BY PATHOLOGIST: CPT

## 2018-10-03 PROCEDURE — 82105 ALPHA-FETOPROTEIN SERUM: CPT

## 2018-10-03 PROCEDURE — 82042 OTHER SOURCE ALBUMIN QUAN EA: CPT

## 2018-10-03 PROCEDURE — 86900 BLOOD TYPING SEROLOGIC ABO: CPT

## 2018-10-03 RX ORDER — FERROUS SULFATE 325(65) MG
1 TABLET ORAL
Qty: 30 | Refills: 0 | OUTPATIENT
Start: 2018-10-03 | End: 2018-11-01

## 2018-10-03 RX ORDER — PANTOPRAZOLE SODIUM 20 MG/1
1 TABLET, DELAYED RELEASE ORAL
Qty: 0 | Refills: 0 | COMMUNITY
Start: 2018-10-03

## 2018-10-03 RX ORDER — CARVEDILOL PHOSPHATE 80 MG/1
1 CAPSULE, EXTENDED RELEASE ORAL
Qty: 60 | Refills: 0 | OUTPATIENT
Start: 2018-10-03

## 2018-10-03 RX ORDER — METOPROLOL TARTRATE 50 MG
1 TABLET ORAL
Qty: 0 | Refills: 0 | COMMUNITY

## 2018-10-03 RX ORDER — FUROSEMIDE 40 MG
1 TABLET ORAL
Qty: 30 | Refills: 0 | OUTPATIENT
Start: 2018-10-03 | End: 2018-11-01

## 2018-10-03 RX ORDER — SPIRONOLACTONE 25 MG/1
8 TABLET, FILM COATED ORAL
Qty: 240 | Refills: 0 | OUTPATIENT
Start: 2018-10-03 | End: 2018-11-01

## 2018-10-03 RX ADMIN — Medication 60 MILLIGRAM(S): at 05:45

## 2018-10-03 RX ADMIN — CARVEDILOL PHOSPHATE 6.25 MILLIGRAM(S): 80 CAPSULE, EXTENDED RELEASE ORAL at 05:46

## 2018-10-03 RX ADMIN — CARVEDILOL PHOSPHATE 6.25 MILLIGRAM(S): 80 CAPSULE, EXTENDED RELEASE ORAL at 17:22

## 2018-10-03 RX ADMIN — PANTOPRAZOLE SODIUM 40 MILLIGRAM(S): 20 TABLET, DELAYED RELEASE ORAL at 05:46

## 2018-10-03 RX ADMIN — SPIRONOLACTONE 200 MILLIGRAM(S): 25 TABLET, FILM COATED ORAL at 05:46

## 2018-10-03 RX ADMIN — Medication 40 MILLIEQUIVALENT(S): at 13:55

## 2018-10-03 NOTE — DISCHARGE NOTE ADULT - HOSPITAL COURSE
Patient: ANTON SHINE [id number]                 Internal Medicine Hospitalist Discharge Note    HPI:  Hospitalist service called at present to assume care of this patient.  48M h/o HTN, EtOH Abuse, Cirrhosis x 2.5 years, presented to ER with abdominal pain x 3 days, seen by surgery for possible incarcerated umbilical hernia.  CT A/P showed cirrhosis with significant ascites.  Per surgery, no incarcerated bowel.  Pt admits to daily EtOH use since age 18.  States he was diagnosed with cirrhosis in [facility] 2.5y ago, but has not followed up.  Noted worsening edema and that his umbilical hernia was not reducible.  Last BM ~ 5 days ago.  No nausea / vomiting.  Denies SOB.  No additional complaints.     Interim History: Underwent paracentesis, removing reportedly ~ 2 liters.  Started on Coreg, Spironolactone, Lasix.  Awaiting EGD for esophageal varices.  Weight 102.4 -> 100.      Today, denies complaints.  Ambulating without difficulty.  No abdominal pan.  + Bm.  No additional complaints.   > Cirrhosis with portal hypertension and ascites; Generalized anasarca - likely secondary to ETOH abuse, additional workup per GI for secondary etiologies.  Hepatitis Panel ordered.  On Aldactone, Coreg, Lasix.  I d/w patient his Child's Score -9, indicating Class B cirrhosis.  Emphasized elevated mortality rate, need for close medical followup, evaluation at liver transplant center.  Discussed diet and lifestyle modification, monitoring of daily weight.  He acknowledged understanding.    > Paraesophageal varices - No s/s of bleeding.  PPI.  EGD per GI.  Likely d/c home afterwards, d/w Dr. Busch.   > Hypokalemia - K now stable.  Monitor BMP.    > HTN - Continue BBlocker.  BP stable.   > Iron deficiency Anemia - no signs of acute bleeding.  Supplement Fe. Outpatient GI followup.       Disposition: stable for discharge.  Outpatient followup as above.  Patient was advised to return if they experience any recurrence or worsening of symptoms.  Total time spent on discharge was 35 minutes.  -Joe Maloney D.O., Hospitalist, [facility] Patient: ANTON SHINE [id number]                 Internal Medicine Hospitalist Discharge Note    HPI:  Hospitalist service called at present to assume care of this patient.  48M h/o HTN, EtOH Abuse, Cirrhosis x 2.5 years, presented to ER with abdominal pain x 3 days, seen by surgery for possible incarcerated umbilical hernia.  CT A/P showed cirrhosis with significant ascites.  Per surgery, no incarcerated bowel.  Pt admits to daily EtOH use since age 18.  States he was diagnosed with cirrhosis in [facility] 2.5y ago, but has not followed up.  Noted worsening edema and that his umbilical hernia was not reducible.  Last BM ~ 5 days ago.  No nausea / vomiting.  Denies SOB.  No additional complaints.     Interim History: Underwent paracentesis, removing reportedly ~ 2 liters.  Started on Coreg, Spironolactone, Lasix.  Awaiting EGD for esophageal varices.  Weight 102.4 -> 100.      Today, denies complaints.  Ambulating without difficulty.  No abdominal pan.  + Bm.  No additional complaints.   EGD Showed antral gastritis and grade 1 varices.      > Cirrhosis with portal hypertension and ascites; Generalized anasarca - likely secondary to ETOH abuse, additional workup per GI for secondary etiologies.  Hepatitis Panel ordered.  On Aldactone, Coreg, Lasix.  I d/w patient his Child's Score -9, indicating Class B cirrhosis.  Emphasized elevated mortality rate, need for close medical followup, evaluation at liver transplant center.  Discussed diet and lifestyle modification, monitoring of daily weight.  He acknowledged understanding.    > Paraesophageal varices - No s/s of bleeding.  PPI.  EGD per GI.  Likely d/c home afterwards, d/w Dr. Busch.   > Hypokalemia - K now stable.  Monitor BMP.    > HTN - Continue BBlocker.  BP stable.   > Iron deficiency Anemia - no signs of acute bleeding.  Supplement Fe. Outpatient GI followup.       Disposition: stable for discharge.  Outpatient followup as above.  Patient was advised to return if they experience any recurrence or worsening of symptoms.  Total time spent on discharge was 50 minutes.  -Joe Maloney D.O., Hospitalist, [facility]

## 2018-10-03 NOTE — PROGRESS NOTE ADULT - SUBJECTIVE AND OBJECTIVE BOX
Patient: ANTON SHINE [id number] 48y Male                           Internal Medicine Hospitalist Progress Note  Chief Complaint: Patient is a 48y old  Male who presents with a chief complaint of   HPI:  Hospitalist service called at present to assume care of this patient.  48M h/o HTN, EtOH Abuse, Cirrhosis x 2.5 years, presented to ER with abdominal pain x 3 days, seen by surgery for possible incarcerated umbilical hernia.  CT A/P showed cirrhosis with significant ascites.  Per surgery, no incarcerated bowel.  Pt admits to daily EtOH use since age 18.  States he was diagnosed with cirrhosis in [facility] 2.5y ago, but has not followed up.  Noted worsening edema and that his umbilical hernia was not reducible.  Last BM ~ 5 days ago.  No nausea / vomiting.  Denies SOB.  No additional complaints.     Interim History: underwent paracentesis, removing reportedly ~ 2 liters.  States his abdominal pain is much better.  no fever / chills.  No additional complaints..     ____________________PHYSICAL EXAM:  Vitals reviewed as indicated below  GENERAL:  NAD, pleasant, Alert and Oriented x 3   HEENT: NCAT  CARDIOVASCULAR:  S1, S2  LUNGS: CTAB  ABDOMEN:  soft, (mild umbilical) tenderness, (-) distension, (+) bowel sounds, (-) guarding, (-) rebound (-) rigidity, soft umbilical hernia, appears to be fluid filled.   EXTREMITIES:  no cyanosis / clubbing / 3+ edema.   ____________________      BACKGROUND:  HEALTH ISSUES - PROBLEM Dx:  Hypertension: Hypertension  Umbilical hernia, incarcerated: Umbilical hernia, incarcerated        Allergies    penicillin (Hives)    Intolerances      PAST MEDICAL & SURGICAL HISTORY:  Hypertension  No significant past surgical history        VITALS:  Vital Signs Last 24 Hrs  T(C): 36.8 (26 Sep 2018 15:54), Max: 37 (26 Sep 2018 12:04)  T(F): 98.2 (26 Sep 2018 15:54), Max: 98.6 (26 Sep 2018 12:04)  HR: 82 (26 Sep 2018 15:54) (82 - 105)  BP: 123/76 (26 Sep 2018 15:54) (122/71 - 125/78)  BP(mean): --  RR: 20 (26 Sep 2018 12:04) (18 - 20)  SpO2: 97% (26 Sep 2018 15:54) (95% - 97%) Daily Height in cm: 177.8 (26 Sep 2018 10:01)    Daily   CAPILLARY BLOOD GLUCOSE        I&O's Summary        LABS:                        10.6   12.6  )-----------( 285      ( 26 Sep 2018 11:02 )             32.6     09-26    134<L>  |  93<L>  |  8.0  ----------------------------<  122<H>  3.2<L>   |  31.0<H>  |  0.65    Ca    7.0<L>      26 Sep 2018 11:02    TPro  5.5<L>  /  Alb  1.8<L>  /  TBili  2.2<H>  /  DBili  x   /  AST  67<H>  /  ALT  29  /  AlkPhos  115  09-26    PT/INR - ( 26 Sep 2018 14:01 )   PT: 14.2 sec;   INR: 1.28 ratio         PTT - ( 26 Sep 2018 13:17 )  PTT:30.4 sec  LIVER FUNCTIONS - ( 26 Sep 2018 11:02 )  Alb: 1.8 g/dL / Pro: 5.5 g/dL / ALK PHOS: 115 U/L / ALT: 29 U/L / AST: 67 U/L / GGT: x                     MEDICATIONS:  MEDICATIONS  (STANDING):  clindamycin IVPB 900 milliGRAM(s) IV Intermittent once  enoxaparin Injectable 40 milliGRAM(s) SubCutaneous daily  hydrochlorothiazide 25 milliGRAM(s) Oral daily  lactated ringers. 1000 milliLiter(s) (150 mL/Hr) IV Continuous <Continuous>  metoprolol succinate ER 25 milliGRAM(s) Oral daily  potassium chloride  10 mEq/100 mL IVPB 10 milliEquivalent(s) IV Intermittent every 1 hour    MEDICATIONS  (PRN):  acetaminophen  IVPB .. 1000 milliGRAM(s) IV Intermittent once PRN Mild Pain (1 - 3)  HYDROmorphone  Injectable 0.5 milliGRAM(s) IV Push every 4 hours PRN Moderate Pain (4 - 6)  ondansetron Injectable 4 milliGRAM(s) IV Push every 6 hours PRN Nausea and/or Vomiting      < from: CT Abdomen and Pelvis w/ Oral Cont and w/ IV Cont (09.26.18 @ 12:52) >     EXAM:  CT ABDOMEN AND PELVIS OC IC                          PROCEDURE DATE:  09/26/2018          INTERPRETATION:  DATE: 9/26/2018 12:52 PM  COMPARISON: None  CLINICAL INFORMATION: Abdominal pain, umbilical hernia  PROCEDURE:  CT of the Abdomen andPelvis was performed withintravenous   contrast.  90 ml of Omnipaque 350 was injected intravenously.     Oral   contrast was not administered secondary to ordering physician request        FINDINGS:    LOWER CHEST: Trace to small bilateral pleural effusions with minimal   bibasilar atelectasis.    ABDOMEN AND PELVIS:    LIVER: Morphologic changes in the liver compatible with cirrhosis.   Extensive areas of decreased attenuation throughout the liver   particularly in the right hepatic lobe may represent areas of severe   hepatic steatosis. Round areas of hypoattenuation in the left hepatic   lobe may represent areas of fatty infiltration however focal hepatic   masses cannot be excluded. An infiltrating hepatocellular carcinoma also   cannot be excluded. Small area of hypervascularity in the right hepatic   dome measuring 0.6 cm, series 2 image 24. Rosa hepatis and portacaval   lymph nodes likely reactive in nature. Portal vein is patent. Large   paraesophageal varices identified. Patent umbilical vein. A splenorenal   shunt is visualized.  BILE DUCTS: normal caliber.  GALLBLADDER: No calcified gallstones. Normal caliber wall.  PANCREAS: within normal limits.  SPLEEN: within normal limits.    ADRENALS: within normal limits.  KIDNEYS, URETERS AND BLADDER: Kidneys enhance bilaterally and   symmetrically without hydronephrosis, renal mass or renal calculus. The   ureters and bladder are within normal limits.    REPRODUCTIVE ORGANS: No pelvic masses. Mild pelvic free fluid.No pelvic   lymphadenopathy.    BOWEL: No bowel obstruction. Colonic diverticulosis without   diverticulitis. Mild thickening of the right colon likely related to the   cirrhosis and portal hypertension. Mild thickening of a mild amount of   small bowel in the midabdomen suggesting jejunitis and/or ileitis..   Normal appendix. Mesenteric and right lower quadrant lymph nodes. A   normal appendix was visualized.No enlarged mesenteric lymph nodes.  PERITONEUM: Moderate abdominal pelvic ascites. Mesenteric edema. No free   air.    VESSELS: Atherosclerotic changes .Findings as described above consistent   with portal hypertension. Accessory right hepatic artery from the   superior mesenteric artery, a normal variant.  RETROPERITONEUM:     Within normal limits.      ABDOMINAL WALL: Umbilical hernia and supraumbilical hernia containing   fluid. Mild to moderate anasarca.  BONES: Degenerative changes.    IMPRESSION:      Cirrhosis with portal hypertension with moderate abdominal and pelvic   ascites and mesenteric edema. Large paraesophageal varices and a   splenorenal shunt.    Diffuse areas of hypoattenuation throughout the right and left hepatic   lobe which may represent periportal fat deposition however hepatocellular   carcinoma and/or infiltratinghepatocellular carcinoma cannot be   excluded. Further evaluation with MRI of the abdomen with and without   contrast is recommended and can be obtained in a nonemergent basis.    No bowel obstruction.  Mild thickening of a mild amount of small bowelin   the midabdomen suggesting jejunitis and/or ileitis    Umbilical and supraumbilical hernias containing fluid.                SIERRA GRAMAJO M.D., ATTENDING RADIOLOGIST  This document has been electronically signed. Sep 26 2018  1:22PM                  < end of copied text >
Patient: ANTON SHINE [id number] 48y Male                           Internal Medicine Hospitalist Progress Note  Chief Complaint: Patient is a 48y old  Male who presents with a chief complaint of   HPI:  Hospitalist service called at present to assume care of this patient.  48M h/o HTN, EtOH Abuse, Cirrhosis x 2.5 years, presented to ER with abdominal pain x 3 days, seen by surgery for possible incarcerated umbilical hernia.  CT A/P showed cirrhosis with significant ascites.  Per surgery, no incarcerated bowel.  Pt admits to daily EtOH use since age 18.  States he was diagnosed with cirrhosis in [facility] 2.5y ago, but has not followed up.  Noted worsening edema and that his umbilical hernia was not reducible.  Last BM ~ 5 days ago.  No nausea / vomiting.  Denies SOB.  No additional complaints.     Interim History: underwent paracentesis, removing reportedly ~ 2 liters.  Started on Coreg, Spironolactone, Lasix.  Denies complaints.  No SOB.  No Abdominal pain, nausea, vomiting, diarrhea, nor constipation.  Denies bleeding.     ____________________PHYSICAL EXAM:  Vitals reviewed as indicated below  GENERAL:  NAD, pleasant, Alert and Oriented x 3.  Generalized anasarca.   HEENT: NCAT  CARDIOVASCULAR:  S1, S2  LUNGS: CTAB  ABDOMEN:  soft, (mild umbilical) tenderness, (-) distension, (+) bowel sounds, (-) guarding, (-) rebound (-) rigidity, soft umbilical hernia, appears to be fluid filled.   EXTREMITIES:  no cyanosis / clubbing / 3+ edema.   ____________________    VITALS:  Vital Signs Last 24 Hrs  T(C): 36.9 (01 Oct 2018 08:28), Max: 37.1 (30 Sep 2018 23:35)  T(F): 98.4 (01 Oct 2018 08:28), Max: 98.7 (30 Sep 2018 23:35)  HR: 80 (01 Oct 2018 08:28) (68 - 81)  BP: 100/70 (01 Oct 2018 08:28) (100/70 - 109/67)  BP(mean): --  RR: 18 (01 Oct 2018 08:28) (18 - 18)  SpO2: 100% (01 Oct 2018 08:28) (95% - 100%) Daily     Daily   CAPILLARY BLOOD GLUCOSE        I&O's Summary    30 Sep 2018 07:01  -  01 Oct 2018 07:00  --------------------------------------------------------  IN: 0 mL / OUT: 300 mL / NET: -300 mL        LABS:                        9.4    4.2   )-----------( 243      ( 01 Oct 2018 07:08 )             28.1     10-01    134<L>  |  102  |  10.0  ----------------------------<  105  3.7   |  24.0  |  0.68    Ca    6.9<L>      01 Oct 2018 07:08    TPro  4.9<L>  /  Alb  1.5<L>  /  TBili  1.0  /  DBili  x   /  AST  59<H>  /  ALT  24  /  AlkPhos  88  10-01      LIVER FUNCTIONS - ( 01 Oct 2018 07:08 )  Alb: 1.5 g/dL / Pro: 4.9 g/dL / ALK PHOS: 88 U/L / ALT: 24 U/L / AST: 59 U/L / GGT: x                   MEDICATIONS:  acetaminophen  IVPB .. 1000 milliGRAM(s) IV Intermittent once PRN  carvedilol 6.25 milliGRAM(s) Oral every 12 hours  furosemide    Tablet 60 milliGRAM(s) Oral daily  HYDROmorphone  Injectable 0.5 milliGRAM(s) IV Push every 4 hours PRN  ondansetron Injectable 4 milliGRAM(s) IV Push every 6 hours PRN  pantoprazole    Tablet 40 milliGRAM(s) Oral before breakfast  potassium chloride    Tablet ER 40 milliEquivalent(s) Oral daily  spironolactone 200 milliGRAM(s) Oral daily
Patient: ANTON SHINE [id number] 48y Male                           Internal Medicine Hospitalist Progress Note  Chief Complaint: Patient is a 48y old  Male who presents with a chief complaint of   HPI:  Hospitalist service called at present to assume care of this patient.  48M h/o HTN, EtOH Abuse, Cirrhosis x 2.5 years, presented to ER with abdominal pain x 3 days, seen by surgery for possible incarcerated umbilical hernia.  CT A/P showed cirrhosis with significant ascites.  Per surgery, no incarcerated bowel.  Pt admits to daily EtOH use since age 18.  States he was diagnosed with cirrhosis in [facility] 2.5y ago, but has not followed up.  Noted worsening edema and that his umbilical hernia was not reducible.  Last BM ~ 5 days ago.  No nausea / vomiting.  Denies SOB.  No additional complaints.     Interim History: underwent paracentesis, removing reportedly ~ 2 liters.  Denies complaints.  No SOB.  No Abdominal pain, nausea, vomiting, diarrhea, nor constipation.     ____________________PHYSICAL EXAM:  Vitals reviewed as indicated below  GENERAL:  NAD, pleasant, Alert and Oriented x 3.  Generalized anasarca.   HEENT: NCAT  CARDIOVASCULAR:  S1, S2  LUNGS: CTAB  ABDOMEN:  soft, (mild umbilical) tenderness, (-) distension, (+) bowel sounds, (-) guarding, (-) rebound (-) rigidity, soft umbilical hernia, appears to be fluid filled.   EXTREMITIES:  no cyanosis / clubbing / 3+ edema.   ____________________    VITALS:  Vital Signs Last 24 Hrs  T(C): 36.6 (29 Sep 2018 08:00), Max: 37 (28 Sep 2018 15:23)  T(F): 97.9 (29 Sep 2018 08:00), Max: 98.6 (28 Sep 2018 15:23)  HR: 73 (29 Sep 2018 08:00) (73 - 80)  BP: 105/65 (29 Sep 2018 08:00) (102/65 - 110/80)  BP(mean): --  RR: 18 (29 Sep 2018 08:00) (18 - 18)  SpO2: 97% (29 Sep 2018 08:00) (96% - 98%) Daily     Daily   CAPILLARY BLOOD GLUCOSE        I&O's Summary      LABS:                        9.0    3.4   )-----------( 187      ( 29 Sep 2018 08:17 )             28.2     09-29    134<L>  |  99  |  9.0  ----------------------------<  103  3.8   |  27.0  |  0.65    Ca    6.8<L>      29 Sep 2018 08:17    TPro  4.5<L>  /  Alb  1.3<L>  /  TBili  1.1  /  DBili  x   /  AST  52<H>  /  ALT  21  /  AlkPhos  88  09-29    PT/INR - ( 28 Sep 2018 08:56 )   PT: 14.0 sec;   INR: 1.27 ratio           LIVER FUNCTIONS - ( 29 Sep 2018 08:17 )  Alb: 1.3 g/dL / Pro: 4.5 g/dL / ALK PHOS: 88 U/L / ALT: 21 U/L / AST: 52 U/L / GGT: x                   MEDICATIONS:  acetaminophen  IVPB .. 1000 milliGRAM(s) IV Intermittent once PRN  carvedilol 6.25 milliGRAM(s) Oral every 12 hours  furosemide    Tablet 40 milliGRAM(s) Oral daily  HYDROmorphone  Injectable 0.5 milliGRAM(s) IV Push every 4 hours PRN  ondansetron Injectable 4 milliGRAM(s) IV Push every 6 hours PRN  pantoprazole    Tablet 40 milliGRAM(s) Oral before breakfast  potassium chloride    Tablet ER 40 milliEquivalent(s) Oral daily  spironolactone 100 milliGRAM(s) Oral daily
Patient is a 48y old  Male who presents with a chief complaint of Umbilical Pain.  He is scheduled to have an EGD.     (01 Oct 2018 10:16)      PAST MEDICAL HISTORY:  Hypertension x 10 years  Esophageal varices.  Born with an irregular heart beat.    PAST SURGICAL HISTORY:  No past surgical history      MEDICATIONS  (STANDING):  carvedilol 6.25 milliGRAM(s) Oral every 12 hours  furosemide    Tablet 60 milliGRAM(s) Oral daily  pantoprazole    Tablet 40 milliGRAM(s) Oral before breakfast  potassium chloride    Tablet ER 40 milliEquivalent(s) Oral daily  spironolactone 200 milliGRAM(s) Oral daily    MEDICATIONS  (PRN):  acetaminophen  IVPB .. 1000 milliGRAM(s) IV Intermittent once PRN Mild Pain (1 - 3)  HYDROmorphone  Injectable 0.5 milliGRAM(s) IV Push every 4 hours PRN Moderate Pain (4 - 6)  ondansetron Injectable 4 milliGRAM(s) IV Push every 6 hours PRN Nausea and/or Vomiting      Allergies:     penicillin (Hives)                     ampicillin (Hives)      SOCIAL HISTORY:     He said that he vhad a few drinks after work for years.  He quit smoking 13                                  years ago after smoking 1/3 ppd x 25 years.                           9.4    4.2   )-----------( 243      ( 01 Oct 2018 07:08 )             28.1       PT/INR - ( 28 Sep 2018 08:56 )   PT: 14.0 sec;   INR: 1.27 ratio         PTT - ( 26 Sep 2018 13:17 )  PTT:30.4 sec    10-01    134<L>  |  102  |  10.0  ----------------------------<  105  3.7   |  24.0  |  0.68    Ca    6.9<L>      01 Oct 2018 07:08    TPro  4.9<L>  /  Alb  1.5<L>  /  TBili  1.0  /  DBili  x   /  AST  59<H>  /  ALT  24  /  AlkPhos  88  10-01    Weight (kg): 102.4 (10-01 @ 09:48)    CT ABDOMEN AND PELVIS:  -  9/26/2018  IMPRESSION:    Cirrhosis with portal hypertension with moderate abdominal and pelvic   ascites and mesenteric edema. Large paraesophageal varices and a   splenorenal shunt.    EKG:  -   9/27/2018    Normal sinus rhythm  Low voltage QRS  Inferior infarct , age undetermined  Prolonged QT  Abnormal ECG    TT ECHO:  None    ASA # = 4  Mallampati # = 2
Patient seen and examined;  chart reviewed.  EGD deferred to tomorrow due to heavy endoscopy schedule and  several emergency cases.    Clinically unchanged.  Weight 103.7 kg.  Had increased the diuretics yesterday.  Passing generous amounts of fluids.  No confusion or bleeeding  AFP was normal  Iron studies are normal.  Other serologies pending.      PAST MEDICAL & SURGICAL HISTORY:  Hypertension  No significant past surgical history      ROS:  No Heartburn, regurgitation, dysphagia, odynophagia.  No dyspepsia  No abdominal pain.    No Nausea, vomiting.  No Bleeding.  No hematemesis.   No diarrhea.    No hematochesia.  No weight loss, anorexia.  No edema.      MEDICATIONS  (STANDING):  carvedilol 6.25 milliGRAM(s) Oral every 12 hours  furosemide    Tablet 60 milliGRAM(s) Oral daily  pantoprazole    Tablet 40 milliGRAM(s) Oral before breakfast  potassium chloride    Tablet ER 40 milliEquivalent(s) Oral daily  spironolactone 200 milliGRAM(s) Oral daily    MEDICATIONS  (PRN):  acetaminophen  IVPB .. 1000 milliGRAM(s) IV Intermittent once PRN Mild Pain (1 - 3)  HYDROmorphone  Injectable 0.5 milliGRAM(s) IV Push every 4 hours PRN Moderate Pain (4 - 6)  ondansetron Injectable 4 milliGRAM(s) IV Push every 6 hours PRN Nausea and/or Vomiting      Allergies    penicillin (Hives)    Intolerances        Vital Signs Last 24 Hrs  T(C): 37.1 (01 Oct 2018 23:25), Max: 37.1 (01 Oct 2018 23:25)  T(F): 98.7 (01 Oct 2018 23:25), Max: 98.7 (01 Oct 2018 23:25)  HR: 85 (02 Oct 2018 05:47) (80 - 90)  BP: 96/60 (02 Oct 2018 05:47) (96/60 - 120/70)  BP(mean): --  RR: 18 (02 Oct 2018 05:47) (18 - 18)  SpO2: 98% (02 Oct 2018 05:47) (98% - 100%)    PHYSICAL EXAM:    GENERAL: NAD, well-groomed, well-developed  HEAD:  Atraumatic, Normocephalic  EYES: EOMI, PERRLA, conjunctiva and sclera clear  ENMT: No tonsillar erythema, exudates, or enlargement; Moist mucous membranes, Good dentition, No lesions  NECK: Supple, No JVD, Normal thyroid  CHEST/LUNG: Clear to percussion bilaterally; No rales, rhonchi, wheezing, or rubs  HEART: Regular rate and rhythm; No murmurs, rubs, or gallops  ABDOMEN: Soft, Nontender, Nondistended; Bowel sounds present; protuberant.  Reduceable umbilical hernia.  Same flank edema and leg edema.    EXTREMITIES:  2+ Peripheral Pulses, No clubbing, cyanosis, or edema  LYMPH: No lymphadenopathy noted  SKIN: No rashes or lesions      LABS:                        9.4    4.2   )-----------( 243      ( 01 Oct 2018 07:08 )             28.1     10-02    134<L>  |  103  |  10.0  ----------------------------<  103  4.0   |  24.0  |  0.76    Ca    7.0<L>      02 Oct 2018 07:24  Mg     1.7     10-02    TPro  4.9<L>  /  Alb  1.5<L>  /  TBili  1.0  /  DBili  x   /  AST  59<H>  /  ALT  24  /  AlkPhos  88  10-01    Fe: 23;  & sat: 10;  Ferritin 68.       AFP:  7.5.      RADIOLOGY & ADDITIONAL STUDIES:  MRI:  No HCC.  Cirrhosis, FL, SM, Mod ascites; Umb hernia.
Patient seen and examined;  chart reviewed.  K up to 3.8 finally.  Weight up from 99.8 to 100.2 kg.  Anasarca persists.  Tolerates diet DASH.  Off antibiotics.  Off of IVF.  Alleges good urination.  Not tremulous or disoriented.      PAST MEDICAL & SURGICAL HISTORY:  Hypertension  No significant past surgical history      ROS:  No Heartburn, regurgitation, dysphagia, odynophagia.  No dyspepsia  No abdominal pain.    No Nausea, vomiting.  No Bleeding.  No hematemesis.   No diarrhea.    No hematochesia.  No weight loss, anorexia.  No edema.      MEDICATIONS  (STANDING):  carvedilol 6.25 milliGRAM(s) Oral every 12 hours  pantoprazole    Tablet 40 milliGRAM(s) Oral before breakfast  spironolactone 100 milliGRAM(s) Oral daily    MEDICATIONS  (PRN):  acetaminophen  IVPB .. 1000 milliGRAM(s) IV Intermittent once PRN Mild Pain (1 - 3)  HYDROmorphone  Injectable 0.5 milliGRAM(s) IV Push every 4 hours PRN Moderate Pain (4 - 6)  ondansetron Injectable 4 milliGRAM(s) IV Push every 6 hours PRN Nausea and/or Vomiting      Allergies    penicillin (Hives)    Intolerances        Vital Signs Last 24 Hrs  T(C): 36.6 (29 Sep 2018 08:00), Max: 37 (28 Sep 2018 15:23)  T(F): 97.9 (29 Sep 2018 08:00), Max: 98.6 (28 Sep 2018 15:23)  HR: 73 (29 Sep 2018 08:00) (73 - 80)  BP: 105/65 (29 Sep 2018 08:00) (102/65 - 110/80)  BP(mean): --  RR: 18 (29 Sep 2018 08:00) (18 - 18)  SpO2: 97% (29 Sep 2018 08:00) (96% - 98%)    PHYSICAL EXAM:    GENERAL: NAD, well-groomed, well-developed  HEAD:  Atraumatic, Normocephalic  EYES: EOMI, PERRLA, conjunctiva and sclera clear  ENMT: No tonsillar erythema, exudates, or enlargement; Moist mucous membranes, Good dentition, No lesions  NECK: Supple, No JVD, Normal thyroid  CHEST/LUNG: Clear to percussion bilaterally; No rales, rhonchi, wheezing, or rubs  HEART: Regular rate and rhythm; No murmurs, rubs, or gallops  ABDOMEN: Soft, Nontender, Nondistended; Bowel sounds present  EXTREMITIES:  2+ Peripheral Pulses, No clubbing, cyanosis, or edema  LYMPH: No lymphadenopathy noted  SKIN: No rashes or lesions      LABS:                        9.0    3.4   )-----------( 187      ( 29 Sep 2018 08:17 )             28.2     09-29    134<L>  |  99  |  9.0  ----------------------------<  103  3.8   |  27.0  |  0.65    Ca    6.8<L>      29 Sep 2018 08:17    TPro  4.5<L>  /  Alb  1.3<L>  /  TBili  1.1  /  DBili  x   /  AST  52<H>  /  ALT  21  /  AlkPhos  88  09-29    PT/INR - ( 28 Sep 2018 08:56 )   PT: 14.0 sec;   INR: 1.27 ratio                  RADIOLOGY & ADDITIONAL STUDIES:
Patient seen and examined; chart reviewed.  Feels better.  Diuresis in progress.  Alleges to having frequent urination.  No bleeding;  no confusion.  MRI results reviewed with pt.  Weight today up to 103,7 from 98.3.      PAST MEDICAL & SURGICAL HISTORY:  Hypertension  No significant past surgical history      ROS:  No Heartburn, regurgitation, dysphagia, odynophagia.  No dyspepsia  No abdominal pain.    No Nausea, vomiting.  No Bleeding.  No hematemesis.   No diarrhea.    No hematochesia.  No weight loss, anorexia.  No edema.      MEDICATIONS  (STANDING):  carvedilol 6.25 milliGRAM(s) Oral every 12 hours  furosemide    Tablet 60 milliGRAM(s) Oral daily  pantoprazole    Tablet 40 milliGRAM(s) Oral before breakfast  potassium chloride    Tablet ER 40 milliEquivalent(s) Oral daily  spironolactone 200 milliGRAM(s) Oral daily    MEDICATIONS  (PRN):  acetaminophen  IVPB .. 1000 milliGRAM(s) IV Intermittent once PRN Mild Pain (1 - 3)  HYDROmorphone  Injectable 0.5 milliGRAM(s) IV Push every 4 hours PRN Moderate Pain (4 - 6)  ondansetron Injectable 4 milliGRAM(s) IV Push every 6 hours PRN Nausea and/or Vomiting      Allergies    penicillin (Hives)    Intolerances        Vital Signs Last 24 Hrs  T(C): 37.1 (30 Sep 2018 23:35), Max: 37.1 (30 Sep 2018 23:35)  T(F): 98.7 (30 Sep 2018 23:35), Max: 98.7 (30 Sep 2018 23:35)  HR: 68 (01 Oct 2018 05:21) (68 - 81)  BP: 108/68 (01 Oct 2018 05:21) (102/65 - 109/67)  BP(mean): --  RR: 18 (30 Sep 2018 23:35) (18 - 18)  SpO2: 98% (30 Sep 2018 23:35) (95% - 98%)    PHYSICAL EXAM:    GENERAL: NAD, well-groomed, well-developed  HEAD:  Atraumatic, Normocephalic  EYES: EOMI, PERRLA, conjunctiva and sclera clear  ENMT: No tonsillar erythema, exudates, or enlargement; Moist mucous membranes, Good dentition, No lesions  NECK: Supple, No JVD, Normal thyroid  CHEST/LUNG: Clear to percussion bilaterally; No rales, rhonchi, wheezing, or rubs  HEART: Regular rate and rhythm; No murmurs, rubs, or gallops  ABDOMEN: Soft, Nontender, Moderate distendtion; Bowel sounds present.  Left flank scar, trauma.  Reduceable umbilical hernia andmoderate ascites.  Persistent flank edema   EXTREMITIES:  2+ Peripheral Pulses, No clubbing, cyanosis.  +3 edema to the groins.   LYMPH: No lymphadenopathy noted  SKIN: No rashes or lesions      LABS:                        9.4    4.2   )-----------( 243      ( 01 Oct 2018 07:08 )             28.1     10-01    134<L>  |  102  |  10.0  ----------------------------<  105  3.7   |  24.0  |  0.68    Ca    6.9<L>      01 Oct 2018 07:08    TPro  4.9<L>  /  Alb  1.5<L>  /  TBili  1.0  /  DBili  x   /  AST  59<H>  /  ALT  24  /  AlkPhos  88  10-01             RADIOLOGY & ADDITIONAL STUDIES:  MRI:  Cirrhosis.  Moderate ascites,  FL;  Moderate splenomegaly  No HCC.  Mesenteric and bowel edema, c/w ascites.
Patient seen and examined; chart reviewed.  Had LVP of 2 liters of ascites yesterday  Albumin: 0.3   SAA.3 c/w cirrhosis;  Feels better.  Weight 99.9 kg.  No leakage at tap site.  Alert awake.  Received KCL PO and iv.  Repeat BW pending.  Denies abdominal pain.  Alleges to have had weight gain of about 20 # in past month.  States normal BM's.  Awaiting MRI later today.      PAST MEDICAL & SURGICAL HISTORY:  Hypertension  No significant past surgical history      ROS:  No Heartburn, regurgitation, dysphagia, odynophagia.  No dyspepsia  No abdominal pain.    No Nausea, vomiting.  No Bleeding.  No hematemesis.   No diarrhea.    No hematochesia.  No weight loss, anorexia.  No edema.      MEDICATIONS  (STANDING):  carvedilol 6.25 milliGRAM(s) Oral every 12 hours  pantoprazole    Tablet 40 milliGRAM(s) Oral before breakfast  spironolactone 100 milliGRAM(s) Oral daily    MEDICATIONS  (PRN):  acetaminophen  IVPB .. 1000 milliGRAM(s) IV Intermittent once PRN Mild Pain (1 - 3)  HYDROmorphone  Injectable 0.5 milliGRAM(s) IV Push every 4 hours PRN Moderate Pain (4 - 6)  ondansetron Injectable 4 milliGRAM(s) IV Push every 6 hours PRN Nausea and/or Vomiting      Allergies    penicillin (Hives)    Intolerances        Vital Signs Last 24 Hrs  T(C): 37 (27 Sep 2018 23:33), Max: 37.1 (27 Sep 2018 22:00)  T(F): 98.6 (27 Sep 2018 23:33), Max: 98.7 (27 Sep 2018 22:00)  HR: 73 (27 Sep 2018 23:33) (68 - 79)  BP: 99/63 (27 Sep 2018 23:33) (99/63 - 116/66)  BP(mean): --  RR: 18 (27 Sep 2018 23:33) (18 - 20)  SpO2: 97% (27 Sep 2018 23:33) (95% - 100%)    PHYSICAL EXAM:    GENERAL: NAD, well-groomed, well-developed;  Anasarca.    HEAD:  Atraumatic, Normocephalic  EYES: EOMI, PERRLA, conjunctiva and sclera clear  ENMT: No tonsillar erythema, exudates, or enlargement; Moist mucous membranes, Good dentition, No lesions  NECK: Supple, No JVD, Normal thyroid  CHEST/LUNG: Clear to percussion bilaterally; No rales, rhonchi, wheezing, or rubs  HEART: Regular rate and rhythm; No murmurs, rubs, or gallops  ABDOMEN: Soft, Nontender, Nondistended; Bowel sounds present.  No HSM.  No MTR.  B/L flank edema and leg edema to the groins.    EXTREMITIES:  2+ Peripheral Pulses, No clubbing, cyanosis.  3+ edema to the groins.    LYMPH: No lymphadenopathy noted  SKIN: No rashes or lesions      LABS:                        9.3    4.9   )-----------( 220      ( 27 Sep 2018 05:51 )             27.4         132<L>  |  95<L>  |  9.0  ----------------------------<  99  3.1<L>   |  29.0  |  0.62    Ca    6.8<L>      27 Sep 2018 05:51    TPro  4.7<L>  /  Alb  1.6<L>  /  TBili  1.7  /  DBili  x   /  AST  52<H>  /  ALT  23  /  AlkPhos  89      PT/INR - ( 26 Sep 2018 14:01 )   PT: 14.2 sec;   INR: 1.28 ratio         PTT - ( 26 Sep 2018 13:17 )  PTT:30.4 sec     Ascites fluid:  Albumin:  0.3.   Serum albumin:  1.6.  SAA.3.    with 70 % polys.     RADIOLOGY & ADDITIONAL STUDIES:  Sono:  [facility]
Patient seen and examined; chart reviewed.  Just completed the Liver MRI.  Had eaten this morning.  Lasix started yesterday along with Aldactone and 40 mg po qd of KCL.  Weight down from 100.2 to 98.2 in 1 day.  Notes considerable urination.      PAST MEDICAL & SURGICAL HISTORY:  Hypertension  No significant past surgical history      ROS:  No Heartburn, regurgitation, dysphagia, odynophagia.  No dyspepsia  No abdominal pain.    No Nausea, vomiting.  No Bleeding.  No hematemesis.   No diarrhea.    No hematochesia.  No weight loss, anorexia.  No edema.      MEDICATIONS  (STANDING):  carvedilol 6.25 milliGRAM(s) Oral every 12 hours  furosemide    Tablet 40 milliGRAM(s) Oral daily  pantoprazole    Tablet 40 milliGRAM(s) Oral before breakfast  potassium chloride    Tablet ER 40 milliEquivalent(s) Oral daily  spironolactone 100 milliGRAM(s) Oral daily    MEDICATIONS  (PRN):  acetaminophen  IVPB .. 1000 milliGRAM(s) IV Intermittent once PRN Mild Pain (1 - 3)  HYDROmorphone  Injectable 0.5 milliGRAM(s) IV Push every 4 hours PRN Moderate Pain (4 - 6)  ondansetron Injectable 4 milliGRAM(s) IV Push every 6 hours PRN Nausea and/or Vomiting      Allergies    penicillin (Hives)    Intolerances        Vital Signs Last 24 Hrs  T(C): 36.9 (29 Sep 2018 23:06), Max: 36.9 (29 Sep 2018 23:06)  T(F): 98.5 (29 Sep 2018 23:06), Max: 98.5 (29 Sep 2018 23:06)  HR: 85 (29 Sep 2018 23:06) (85 - 86)  BP: 101/64 (29 Sep 2018 23:06) (101/64 - 114/74)  BP(mean): --  RR: 20 (29 Sep 2018 23:06) (18 - 20)  SpO2: 95% (29 Sep 2018 23:06) (95% - 97%)    PHYSICAL EXAM:    GENERAL: NAD, well-groomed, well-developed  HEAD:  Atraumatic, Normocephalic  EYES: EOMI, PERRLA, conjunctiva and sclera clear  ENMT: No tonsillar erythema, exudates, or enlargement; Moist mucous membranes, Good dentition, No lesions  NECK: Supple, No JVD, Normal thyroid  CHEST/LUNG: Clear to percussion bilaterally; No rales, rhonchi, wheezing, or rubs  HEART: Regular rate and rhythm; No murmurs, rubs, or gallops  ABDOMEN: Soft, Nontender, Nondistended; Bowel sounds present;  Distention persists.  Umbilical hernia is reduceable.  Flank edema persists.    EXTREMITIES:  2+ Peripheral Pulses, No clubbing, cyanosis.  +3 leg edema to the thighs persists.    LYMPH: No lymphadenopathy noted  SKIN: No rashes or lesions      LABS:                        9.0    3.4   )-----------( 187      ( 29 Sep 2018 08:17 )             28.2     09-30    137  |  102  |  9.0  ----------------------------<  102  3.9   |  25.0  |  0.54    Ca    6.6<L>      30 Sep 2018 07:12    TPro  4.5<L>  /  Alb  1.3<L>  /  TBili  1.1  /  DBili  x   /  AST  52<H>  /  ALT  21  /  AlkPhos  88  09-29             RADIOLOGY & ADDITIONAL STUDIES:  MRI Liver:  Results are pending.
Patient: ANTON SHINE [id number] 48y Male                           Internal Medicine Hospitalist Progress Note  Chief Complaint: Patient is a 48y old  Male who presents with a chief complaint of   HPI:  Hospitalist service called at present to assume care of this patient.  48M h/o HTN, EtOH Abuse, Cirrhosis x 2.5 years, presented to ER with abdominal pain x 3 days, seen by surgery for possible incarcerated umbilical hernia.  CT A/P showed cirrhosis with significant ascites.  Per surgery, no incarcerated bowel.  Pt admits to daily EtOH use since age 18.  States he was diagnosed with cirrhosis in [facility] 2.5y ago, but has not followed up.  Noted worsening edema and that his umbilical hernia was not reducible.  Last BM ~ 5 days ago.  No nausea / vomiting.  Denies SOB.  No additional complaints.     Interim History: underwent paracentesis, removing reportedly ~ 2 liters.  States his abdominal pain is much better.  no fever / chills.  No additional complaints..     ____________________PHYSICAL EXAM:  Vitals reviewed as indicated below  GENERAL:  NAD, pleasant, Alert and Oriented x 3   HEENT: NCAT  CARDIOVASCULAR:  S1, S2  LUNGS: CTAB  ABDOMEN:  soft, (mild umbilical) tenderness, (-) distension, (+) bowel sounds, (-) guarding, (-) rebound (-) rigidity, soft umbilical hernia, appears to be fluid filled.   EXTREMITIES:  no cyanosis / clubbing / 3+ edema.   ____________________    VITALS:  Vital Signs Last 24 Hrs  T(C): 36.7 (28 Sep 2018 09:50), Max: 37.1 (27 Sep 2018 22:00)  T(F): 98 (28 Sep 2018 09:50), Max: 98.7 (27 Sep 2018 22:00)  HR: 75 (28 Sep 2018 09:50) (73 - 79)  BP: 110/65 (28 Sep 2018 09:50) (99/63 - 116/66)  BP(mean): --  RR: 18 (28 Sep 2018 09:50) (18 - 20)  SpO2: 98% (28 Sep 2018 09:50) (95% - 98%) Daily     Daily   CAPILLARY BLOOD GLUCOSE        I&O's Summary      LABS:                        9.3    3.6   )-----------( 209      ( 28 Sep 2018 08:56 )             28.8     09-28    134<L>  |  98  |  12.0  ----------------------------<  112  3.0<L>   |  28.0  |  0.63    Ca    6.7<L>      28 Sep 2018 08:56    TPro  4.5<L>  /  Alb  1.2<L>  /  TBili  1.1  /  DBili  x   /  AST  52<H>  /  ALT  20  /  AlkPhos  93  09-28    PT/INR - ( 28 Sep 2018 08:56 )   PT: 14.0 sec;   INR: 1.27 ratio           LIVER FUNCTIONS - ( 28 Sep 2018 08:56 )  Alb: 1.2 g/dL / Pro: 4.5 g/dL / ALK PHOS: 93 U/L / ALT: 20 U/L / AST: 52 U/L / GGT: x                   MEDICATIONS:  acetaminophen  IVPB .. 1000 milliGRAM(s) IV Intermittent once PRN  carvedilol 6.25 milliGRAM(s) Oral every 12 hours  HYDROmorphone  Injectable 0.5 milliGRAM(s) IV Push every 4 hours PRN  ondansetron Injectable 4 milliGRAM(s) IV Push every 6 hours PRN  pantoprazole    Tablet 40 milliGRAM(s) Oral before breakfast  potassium chloride    Tablet ER 40 milliEquivalent(s) Oral every 4 hours  spironolactone 100 milliGRAM(s) Oral daily
Patient: ANTON SHINE [id number] 48y Male                           Internal Medicine Hospitalist Progress Note  Chief Complaint: Patient is a 48y old  Male who presents with a chief complaint of   HPI:  Hospitalist service called at present to assume care of this patient.  48M h/o HTN, EtOH Abuse, Cirrhosis x 2.5 years, presented to ER with abdominal pain x 3 days, seen by surgery for possible incarcerated umbilical hernia.  CT A/P showed cirrhosis with significant ascites.  Per surgery, no incarcerated bowel.  Pt admits to daily EtOH use since age 18.  States he was diagnosed with cirrhosis in [facility] 2.5y ago, but has not followed up.  Noted worsening edema and that his umbilical hernia was not reducible.  Last BM ~ 5 days ago.  No nausea / vomiting.  Denies SOB.  No additional complaints.     ____________________PHYSICAL EXAM:  Vitals reviewed as indicated below  GENERAL:  NAD, pleasant, Alert and Oriented x 3   HEENT: NCAT  CARDIOVASCULAR:  S1, S2  LUNGS: CTAB  ABDOMEN:  soft, (mild umbilical) tenderness, (+) distension with ascites, (+) bowel sounds, (-) guarding, (-) rebound (-) rigidity, soft umbilical hernia, appears to be fluid filled.   EXTREMITIES:  no cyanosis / clubbing / 3+ edema.   ____________________      BACKGROUND:  HEALTH ISSUES - PROBLEM Dx:  Hypertension: Hypertension  Umbilical hernia, incarcerated: Umbilical hernia, incarcerated        Allergies    penicillin (Hives)    Intolerances      PAST MEDICAL & SURGICAL HISTORY:  Hypertension  No significant past surgical history        VITALS:  Vital Signs Last 24 Hrs  T(C): 36.8 (26 Sep 2018 15:54), Max: 37 (26 Sep 2018 12:04)  T(F): 98.2 (26 Sep 2018 15:54), Max: 98.6 (26 Sep 2018 12:04)  HR: 82 (26 Sep 2018 15:54) (82 - 105)  BP: 123/76 (26 Sep 2018 15:54) (122/71 - 125/78)  BP(mean): --  RR: 20 (26 Sep 2018 12:04) (18 - 20)  SpO2: 97% (26 Sep 2018 15:54) (95% - 97%) Daily Height in cm: 177.8 (26 Sep 2018 10:01)    Daily   CAPILLARY BLOOD GLUCOSE        I&O's Summary        LABS:                        10.6   12.6  )-----------( 285      ( 26 Sep 2018 11:02 )             32.6     09-26    134<L>  |  93<L>  |  8.0  ----------------------------<  122<H>  3.2<L>   |  31.0<H>  |  0.65    Ca    7.0<L>      26 Sep 2018 11:02    TPro  5.5<L>  /  Alb  1.8<L>  /  TBili  2.2<H>  /  DBili  x   /  AST  67<H>  /  ALT  29  /  AlkPhos  115  09-26    PT/INR - ( 26 Sep 2018 14:01 )   PT: 14.2 sec;   INR: 1.28 ratio         PTT - ( 26 Sep 2018 13:17 )  PTT:30.4 sec  LIVER FUNCTIONS - ( 26 Sep 2018 11:02 )  Alb: 1.8 g/dL / Pro: 5.5 g/dL / ALK PHOS: 115 U/L / ALT: 29 U/L / AST: 67 U/L / GGT: x                     MEDICATIONS:  MEDICATIONS  (STANDING):  clindamycin IVPB 900 milliGRAM(s) IV Intermittent once  enoxaparin Injectable 40 milliGRAM(s) SubCutaneous daily  hydrochlorothiazide 25 milliGRAM(s) Oral daily  lactated ringers. 1000 milliLiter(s) (150 mL/Hr) IV Continuous <Continuous>  metoprolol succinate ER 25 milliGRAM(s) Oral daily  potassium chloride  10 mEq/100 mL IVPB 10 milliEquivalent(s) IV Intermittent every 1 hour    MEDICATIONS  (PRN):  acetaminophen  IVPB .. 1000 milliGRAM(s) IV Intermittent once PRN Mild Pain (1 - 3)  HYDROmorphone  Injectable 0.5 milliGRAM(s) IV Push every 4 hours PRN Moderate Pain (4 - 6)  ondansetron Injectable 4 milliGRAM(s) IV Push every 6 hours PRN Nausea and/or Vomiting      < from: CT Abdomen and Pelvis w/ Oral Cont and w/ IV Cont (09.26.18 @ 12:52) >     EXAM:  CT ABDOMEN AND PELVIS OC IC                          PROCEDURE DATE:  09/26/2018          INTERPRETATION:  DATE: 9/26/2018 12:52 PM  COMPARISON: None  CLINICAL INFORMATION: Abdominal pain, umbilical hernia  PROCEDURE:  CT of the Abdomen andPelvis was performed withintravenous   contrast.  90 ml of Omnipaque 350 was injected intravenously.     Oral   contrast was not administered secondary to ordering physician request        FINDINGS:    LOWER CHEST: Trace to small bilateral pleural effusions with minimal   bibasilar atelectasis.    ABDOMEN AND PELVIS:    LIVER: Morphologic changes in the liver compatible with cirrhosis.   Extensive areas of decreased attenuation throughout the liver   particularly in the right hepatic lobe may represent areas of severe   hepatic steatosis. Round areas of hypoattenuation in the left hepatic   lobe may represent areas of fatty infiltration however focal hepatic   masses cannot be excluded. An infiltrating hepatocellular carcinoma also   cannot be excluded. Small area of hypervascularity in the right hepatic   dome measuring 0.6 cm, series 2 image 24. Rosa hepatis and portacaval   lymph nodes likely reactive in nature. Portal vein is patent. Large   paraesophageal varices identified. Patent umbilical vein. A splenorenal   shunt is visualized.  BILE DUCTS: normal caliber.  GALLBLADDER: No calcified gallstones. Normal caliber wall.  PANCREAS: within normal limits.  SPLEEN: within normal limits.    ADRENALS: within normal limits.  KIDNEYS, URETERS AND BLADDER: Kidneys enhance bilaterally and   symmetrically without hydronephrosis, renal mass or renal calculus. The   ureters and bladder are within normal limits.    REPRODUCTIVE ORGANS: No pelvic masses. Mild pelvic free fluid.No pelvic   lymphadenopathy.    BOWEL: No bowel obstruction. Colonic diverticulosis without   diverticulitis. Mild thickening of the right colon likely related to the   cirrhosis and portal hypertension. Mild thickening of a mild amount of   small bowel in the midabdomen suggesting jejunitis and/or ileitis..   Normal appendix. Mesenteric and right lower quadrant lymph nodes. A   normal appendix was visualized.No enlarged mesenteric lymph nodes.  PERITONEUM: Moderate abdominal pelvic ascites. Mesenteric edema. No free   air.    VESSELS: Atherosclerotic changes .Findings as described above consistent   with portal hypertension. Accessory right hepatic artery from the   superior mesenteric artery, a normal variant.  RETROPERITONEUM:     Within normal limits.      ABDOMINAL WALL: Umbilical hernia and supraumbilical hernia containing   fluid. Mild to moderate anasarca.  BONES: Degenerative changes.    IMPRESSION:      Cirrhosis with portal hypertension with moderate abdominal and pelvic   ascites and mesenteric edema. Large paraesophageal varices and a   splenorenal shunt.    Diffuse areas of hypoattenuation throughout the right and left hepatic   lobe which may represent periportal fat deposition however hepatocellular   carcinoma and/or infiltratinghepatocellular carcinoma cannot be   excluded. Further evaluation with MRI of the abdomen with and without   contrast is recommended and can be obtained in a nonemergent basis.    No bowel obstruction.  Mild thickening of a mild amount of small bowelin   the midabdomen suggesting jejunitis and/or ileitis    Umbilical and supraumbilical hernias containing fluid.                SIERRA GRAMAJO M.D., ATTENDING RADIOLOGIST  This document has been electronically signed. Sep 26 2018  1:22PM                  < end of copied text >
Patient: ANTON SHINE [id number] 48y Male                           Internal Medicine Hospitalist Progress Note  Chief Complaint: Patient is a 48y old  Male who presents with a chief complaint of   HPI:  Hospitalist service called at present to assume care of this patient.  48M h/o HTN, EtOH Abuse, Cirrhosis x 2.5 years, presented to ER with abdominal pain x 3 days, seen by surgery for possible incarcerated umbilical hernia.  CT A/P showed cirrhosis with significant ascites.  Per surgery, no incarcerated bowel.  Pt admits to daily EtOH use since age 18.  States he was diagnosed with cirrhosis in [facility] 2.5y ago, but has not followed up.  Noted worsening edema and that his umbilical hernia was not reducible.  Last BM ~ 5 days ago.  No nausea / vomiting.  Denies SOB.  No additional complaints.     Interim History: Underwent paracentesis, removing reportedly ~ 2 liters.  Started on Coreg, Spironolactone, Lasix.  Awaiting EGD for esophageal varices.  Weight 102.4 -> 100.      Today, denies complaints.  Ambulating without difficulty.  No abdominal pan.  + Bm.  No additional complaints.     ____________________PHYSICAL EXAM:  Vitals reviewed as indicated below  GENERAL:  NAD, pleasant, Alert and Oriented x 3.  Generalized anasarca.   HEENT: NCAT  CARDIOVASCULAR:  S1, S2  LUNGS: CTAB  ABDOMEN:  soft, (mild umbilical) tenderness, (-) distension, (+) bowel sounds, (-) guarding, (-) rebound (-) rigidity, soft umbilical hernia, appears to be fluid filled.   EXTREMITIES:  no cyanosis / clubbing / 3+ edema.   ____________________    VITALS:  Vital Signs Last 24 Hrs  T(C): 36.9 (03 Oct 2018 09:11), Max: 37.2 (02 Oct 2018 23:44)  T(F): 98.4 (03 Oct 2018 09:11), Max: 98.9 (02 Oct 2018 23:44)  HR: 75 (03 Oct 2018 09:11) (75 - 90)  BP: 103/65 (03 Oct 2018 09:11) (100/60 - 112/72)  BP(mean): --  RR: 18 (03 Oct 2018 09:11) (18 - 18)  SpO2: 100% (03 Oct 2018 09:11) (97% - 100%) Daily     Daily Weight in k.5 (02 Oct 2018 11:26)  CAPILLARY BLOOD GLUCOSE        I&O's Summary    02 Oct 2018 07:01  -  03 Oct 2018 07:00  --------------------------------------------------------  IN: 0 mL / OUT: 800 mL / NET: -800 mL        LABS:                        9.2    4.0   )-----------( 237      ( 03 Oct 2018 07:29 )             27.9     10-03    133<L>  |  103  |  11.0  ----------------------------<  104  3.9   |  22.0  |  0.67    Ca    7.3<L>      03 Oct 2018 07:29  Mg     1.7     10-02    TPro  5.0<L>  /  Alb  1.6<L>  /  TBili  0.8  /  DBili  x   /  AST  59<H>  /  ALT  24  /  AlkPhos  82  10-03    PT/INR - ( 03 Oct 2018 07:29 )   PT: 13.0 sec;   INR: 1.18 ratio           LIVER FUNCTIONS - ( 03 Oct 2018 07:29 )  Alb: 1.6 g/dL / Pro: 5.0 g/dL / ALK PHOS: 82 U/L / ALT: 24 U/L / AST: 59 U/L / GGT: x           Urinalysis Basic - ( 02 Oct 2018 09:02 )    Color: Yellow / Appearance: Clear / S.010 / pH: x  Gluc: x / Ketone: Negative  / Bili: Negative / Urobili: 1 mg/dL   Blood: x / Protein: Negative mg/dL / Nitrite: Negative   Leuk Esterase: Negative / RBC: x / WBC x   Sq Epi: x / Non Sq Epi: x / Bacteria: x            MEDICATIONS:  acetaminophen  IVPB .. 1000 milliGRAM(s) IV Intermittent once PRN  carvedilol 6.25 milliGRAM(s) Oral every 12 hours  furosemide   Injectable 60 milliGRAM(s) IV Push daily  HYDROmorphone  Injectable 0.5 milliGRAM(s) IV Push every 4 hours PRN  ondansetron Injectable 4 milliGRAM(s) IV Push every 6 hours PRN  pantoprazole    Tablet 40 milliGRAM(s) Oral before breakfast  potassium chloride    Tablet ER 40 milliEquivalent(s) Oral daily  spironolactone 200 milliGRAM(s) Oral daily      MRI : Cirrhosis of the liver with evidence of portal hypertension. There is   fatty   infiltration of the liver. Superimposed acute hepatitis is not excluded.   No   visualized liver mass.      Pancreatic edema likely related to the generalized edematous change.   Superimposed acute pancreatitis not excluded.      There is moderate free fluid/ascites. There is generalized third   spacing of   fluid with small bilateral pleural effusions, mesenteric edema and body   wall   edema.      The prior CT better demonstrated generalized mucosal edema along the   small   bowel and colon. This is most likely related to the liver disease and   generalized edema. Superimposed enterocolitis is not excluded.    Images and report were reviewed and approved.      <>
Patient: ANTON SHINE [id number] 48y Male                           Internal Medicine Hospitalist Progress Note  Chief Complaint: Patient is a 48y old  Male who presents with a chief complaint of   HPI:  Hospitalist service called at present to assume care of this patient.  48M h/o HTN, EtOH Abuse, Cirrhosis x 2.5 years, presented to ER with abdominal pain x 3 days, seen by surgery for possible incarcerated umbilical hernia.  CT A/P showed cirrhosis with significant ascites.  Per surgery, no incarcerated bowel.  Pt admits to daily EtOH use since age 18.  States he was diagnosed with cirrhosis in [facility] 2.5y ago, but has not followed up.  Noted worsening edema and that his umbilical hernia was not reducible.  Last BM ~ 5 days ago.  No nausea / vomiting.  Denies SOB.  No additional complaints.     Interim History: underwent paracentesis, removing reportedly ~ 2 liters.  Started on Coreg, Spironolactone, Lasix.      Today, reports compliance with diet, fluid restriction.  Ambulating without difficulty.  No abdominal pan.  + Bm.  No additional complaints.     ____________________PHYSICAL EXAM:  Vitals reviewed as indicated below  GENERAL:  NAD, pleasant, Alert and Oriented x 3.  Generalized anasarca.   HEENT: NCAT  CARDIOVASCULAR:  S1, S2  LUNGS: CTAB  ABDOMEN:  soft, (mild umbilical) tenderness, (-) distension, (+) bowel sounds, (-) guarding, (-) rebound (-) rigidity, soft umbilical hernia, appears to be fluid filled.   EXTREMITIES:  no cyanosis / clubbing / 3+ edema.   ____________________    VITALS:  Vital Signs Last 24 Hrs  T(C): 37.1 (01 Oct 2018 23:25), Max: 37.1 (01 Oct 2018 23:25)  T(F): 98.7 (01 Oct 2018 23:25), Max: 98.7 (01 Oct 2018 23:25)  HR: 85 (02 Oct 2018 05:47) (82 - 90)  BP: 96/60 (02 Oct 2018 05:47) (96/60 - 120/70)  BP(mean): --  RR: 18 (02 Oct 2018 05:47) (18 - 18)  SpO2: 98% (02 Oct 2018 05:47) (98% - 99%) Daily     Daily   CAPILLARY BLOOD GLUCOSE        I&O's Summary    02 Oct 2018 07:01  -  02 Oct 2018 09:35  --------------------------------------------------------  IN: 0 mL / OUT: 800 mL / NET: -800 mL        LABS:                        9.4    4.2   )-----------( 243      ( 01 Oct 2018 07:08 )             28.1     10-02    134<L>  |  103  |  10.0  ----------------------------<  103  4.0   |  24.0  |  0.76    Ca    7.0<L>      02 Oct 2018 07:24  Mg     1.7     10-    TPro  4.9<L>  /  Alb  1.5<L>  /  TBili  1.0  /  DBili  x   /  AST  59<H>  /  ALT  24  /  AlkPhos  88  10-01      LIVER FUNCTIONS - ( 01 Oct 2018 07:08 )  Alb: 1.5 g/dL / Pro: 4.9 g/dL / ALK PHOS: 88 U/L / ALT: 24 U/L / AST: 59 U/L / GGT: x           Urinalysis Basic - ( 02 Oct 2018 09:02 )    Color: Yellow / Appearance: Clear / S.010 / pH: x  Gluc: x / Ketone: Negative  / Bili: Negative / Urobili: 1 mg/dL   Blood: x / Protein: Negative mg/dL / Nitrite: Negative   Leuk Esterase: Negative / RBC: x / WBC x   Sq Epi: x / Non Sq Epi: x / Bacteria: x            MEDICATIONS:  acetaminophen  IVPB .. 1000 milliGRAM(s) IV Intermittent once PRN  carvedilol 6.25 milliGRAM(s) Oral every 12 hours  furosemide   Injectable 60 milliGRAM(s) IV Push daily  HYDROmorphone  Injectable 0.5 milliGRAM(s) IV Push every 4 hours PRN  ondansetron Injectable 4 milliGRAM(s) IV Push every 6 hours PRN  pantoprazole    Tablet 40 milliGRAM(s) Oral before breakfast  potassium chloride    Tablet ER 40 milliEquivalent(s) Oral daily  spironolactone 200 milliGRAM(s) Oral daily
Patient: ANTON SHINE [id number] 48y Male                           Internal Medicine Hospitalist Progress Note  Chief Complaint: Patient is a 48y old  Male who presents with a chief complaint of   HPI:  Hospitalist service called at present to assume care of this patient.  48M h/o HTN, EtOH Abuse, Cirrhosis x 2.5 years, presented to ER with abdominal pain x 3 days, seen by surgery for possible incarcerated umbilical hernia.  CT A/P showed cirrhosis with significant ascites.  Per surgery, no incarcerated bowel.  Pt admits to daily EtOH use since age 18.  States he was diagnosed with cirrhosis in [facility] 2.5y ago, but has not followed up.  Noted worsening edema and that his umbilical hernia was not reducible.  Last BM ~ 5 days ago.  No nausea / vomiting.  Denies SOB.  No additional complaints.     Interim History: underwent paracentesis, removing reportedly ~ 2 liters.  Started on Coreg, Spironolactone, Lasix.  Denies complaints.  No SOB.  No Abdominal pain, nausea, vomiting, diarrhea, nor constipation.     ____________________PHYSICAL EXAM:  Vitals reviewed as indicated below  GENERAL:  NAD, pleasant, Alert and Oriented x 3.  Generalized anasarca.   HEENT: NCAT  CARDIOVASCULAR:  S1, S2  LUNGS: CTAB  ABDOMEN:  soft, (mild umbilical) tenderness, (-) distension, (+) bowel sounds, (-) guarding, (-) rebound (-) rigidity, soft umbilical hernia, appears to be fluid filled.   EXTREMITIES:  no cyanosis / clubbing / 3+ edema.   ____________________    VITALS:  Vital Signs Last 24 Hrs  T(C): 36.9 (29 Sep 2018 23:06), Max: 36.9 (29 Sep 2018 23:06)  T(F): 98.5 (29 Sep 2018 23:06), Max: 98.5 (29 Sep 2018 23:06)  HR: 85 (29 Sep 2018 23:06) (85 - 86)  BP: 101/64 (29 Sep 2018 23:06) (101/64 - 114/74)  BP(mean): --  RR: 20 (29 Sep 2018 23:06) (20 - 20)  SpO2: 95% (29 Sep 2018 23:06) (95% - 95%) Daily     Daily Weight in k.3 (30 Sep 2018 06:55)  CAPILLARY BLOOD GLUCOSE        I&O's Summary    29 Sep 2018 07:01  -  30 Sep 2018 07:00  --------------------------------------------------------  IN: 0 mL / OUT: 200 mL / NET: -200 mL        LABS:                        9.0    3.4   )-----------( 187      ( 29 Sep 2018 08:17 )             28.2         137  |  102  |  9.0  ----------------------------<  102  3.9   |  25.0  |  0.54    Ca    6.6<L>      30 Sep 2018 07:12    TPro  4.5<L>  /  Alb  1.3<L>  /  TBili  1.1  /  DBili  x   /  AST  52<H>  /  ALT  21  /  AlkPhos  88        LIVER FUNCTIONS - ( 29 Sep 2018 08:17 )  Alb: 1.3 g/dL / Pro: 4.5 g/dL / ALK PHOS: 88 U/L / ALT: 21 U/L / AST: 52 U/L / GGT: x                   MEDICATIONS:  acetaminophen  IVPB .. 1000 milliGRAM(s) IV Intermittent once PRN  carvedilol 6.25 milliGRAM(s) Oral every 12 hours  furosemide    Tablet 40 milliGRAM(s) Oral daily  HYDROmorphone  Injectable 0.5 milliGRAM(s) IV Push every 4 hours PRN  ondansetron Injectable 4 milliGRAM(s) IV Push every 6 hours PRN  pantoprazole    Tablet 40 milliGRAM(s) Oral before breakfast  potassium chloride    Tablet ER 40 milliEquivalent(s) Oral daily  spironolactone 100 milliGRAM(s) Oral daily

## 2018-10-03 NOTE — DISCHARGE NOTE ADULT - PLAN OF CARE
stable for discharge It is important to see your primary physician as well as the physicians noted below within the next week to perform a comprehensive medical review.  Call their offices for an appointment as soon as you leave the hospital.  If you do not have a primary physician, contact the [facility] [place] [phone] located on [address] [place] [address].  Your medical issues appear to be stable at this time, but if your symptoms recur or worsen, contact your physicians and/or return to the hospital if necessary.  If you encounter any issues or questions with your medication, call your physicians before stopping the medication.  Do not drive.  Limit your diet to 2 grams of sodium daily.

## 2018-10-03 NOTE — BRIEF OPERATIVE NOTE - PRE-OP DX
Alcoholic cirrhosis of liver with ascites  09/27/2018    Active  Matteo Andrew
Alcoholic cirrhosis of liver with ascites  09/27/2018    Active  Matteo Andrew

## 2018-10-03 NOTE — DISCHARGE NOTE ADULT - SECONDARY DIAGNOSIS.
Umbilical hernia without obstruction and without gangrene Secondary esophageal varices without bleeding Other iron deficiency anemia

## 2018-10-03 NOTE — DISCHARGE NOTE ADULT - CONTRAINDICATIONS & PRECAUTIONS (SELECT ALL THAT APPLY)
Patient/surrogate refused vaccine.../Severe allergy/sensitivity to eggs/thimerosal/other vaccine components or previous serious reaction to vaccine

## 2018-10-03 NOTE — DISCHARGE NOTE ADULT - MEDICATION SUMMARY - MEDICATIONS TO STOP TAKING
I will STOP taking the medications listed below when I get home from the hospital:    hydroCHLOROthiazide 12.5 mg oral tablet  -- 1 tab(s) by mouth once a day    Metoprolol Succinate ER 25 mg oral tablet, extended release  -- 1 tab(s) by mouth once a day

## 2018-10-03 NOTE — DISCHARGE NOTE ADULT - CARE PLAN
Principal Discharge DX:	Alcoholic cirrhosis of liver with ascites Principal Discharge DX:	Alcoholic cirrhosis of liver with ascites  Goal:	stable for discharge  Assessment and plan of treatment:	It is important to see your primary physician as well as the physicians noted below within the next week to perform a comprehensive medical review.  Call their offices for an appointment as soon as you leave the hospital.  If you do not have a primary physician, contact the [facility] at [place] [phone] located on [address] [place] [address].  Your medical issues appear to be stable at this time, but if your symptoms recur or worsen, contact your physicians and/or return to the hospital if necessary.  If you encounter any issues or questions with your medication, call your physicians before stopping the medication.  Do not drive.  Limit your diet to 2 grams of sodium daily.  Secondary Diagnosis:	Umbilical hernia without obstruction and without gangrene  Secondary Diagnosis:	Secondary esophageal varices without bleeding  Secondary Diagnosis:	Other iron deficiency anemia

## 2018-10-03 NOTE — BRIEF OPERATIVE NOTE - COMMENTS
successful therapeutic and diagnostic US guided paracentesis with 2060 mL of straw colored fluid removed  A sample was sent for analysis
A/P  antral gastritis  possible grade 1 esophageal varix  solid diet  start low dose B blocker.

## 2018-10-03 NOTE — DISCHARGE NOTE ADULT - CARE PROVIDER_API CALL
Jacob Busch), Gastroenterology; Internal Medicine  [address] [place] [address]  Phone: [phone]  Fax: [phone]

## 2018-10-03 NOTE — DISCHARGE NOTE ADULT - MEDICATION SUMMARY - MEDICATIONS TO TAKE
I will START or STAY ON the medications listed below when I get home from the hospital:    spironolactone 25 mg oral tablet  -- 8 tab(s) by mouth once a day  -- Indication: For Cirrhosis    carvedilol 6.25 mg oral tablet  -- 1 tab(s) by mouth every 12 hours  -- Indication: For Cirrhosis    furosemide 40 mg oral tablet  -- 1 tab(s) by mouth once a day   -- Avoid prolonged or excessive exposure to direct and/or artificial sunlight while taking this medication.  It is very important that you take or use this exactly as directed.  Do not skip doses or discontinue unless directed by your doctor.  It may be advisable to drink a full glass orange juice or eat a banana daily while taking this medication.    -- Indication: For Cirrhosis    pantoprazole 40 mg oral delayed release tablet  -- 1 tab(s) by mouth once a day (before a meal)  -- Indication: For Varices

## 2018-10-03 NOTE — PROGRESS NOTE ADULT - REASON FOR ADMISSION
Umbilical Pain
Decompensated Cirrhosis
Umbilical Pain/ Decompensated cirrhosis.
decompensated cirrhosis

## 2018-10-03 NOTE — DISCHARGE NOTE ADULT - PATIENT PORTAL LINK FT
You can access the [facility] Patient Portal, offered by [facility], by registering with the following website: [email or web address]

## 2018-10-03 NOTE — PROGRESS NOTE ADULT - ASSESSMENT
> Cirrhosis with portal hypertension and ascites; Generalized anasarca - likely secondary to ETOH abuse, additional workup per GI for secondary etiologies.  Hepatitis Panel ordered.  On Aldactone, Coreg, Lasix.  I d/w patient his Child's Score -9, indicating Class B cirrhosis.  Emphasized elevated mortality rate, need for close medical followup, evaluation at liver transplant center.  Discussed diet and lifestyle modification, monitoring of daily weight.  He acknowledged understanding.    > Paraesophageal varices - No s/s of bleeding.  PPI.  EGD per GI.  Likely d/c home afterwards, d/w Dr. Busch.   > Hypokalemia - K now stable.  Monitor BMP.    > HTN - Continue BBlocker.  BP stable.   > DVT Prophylaxis - Lower extremity intermittent compression devices. > Cirrhosis with portal hypertension and ascites; Generalized anasarca - likely secondary to ETOH abuse, additional workup per GI for secondary etiologies.  Hepatitis Panel ordered.  On Aldactone, Coreg, Lasix.  I d/w patient his Child's Score -9, indicating Class B cirrhosis.  Emphasized elevated mortality rate, need for close medical followup, evaluation at liver transplant center.  Discussed diet and lifestyle modification, monitoring of daily weight.  He acknowledged understanding.    > Paraesophageal varices - No s/s of bleeding.  PPI.  EGD per GI.  Likely d/c home afterwards, d/w Dr. Busch.   > Hypokalemia - K now stable.  Monitor BMP.    > HTN - Continue BBlocker.  BP stable.   > Iron Deficiency Anemia - no indication of active bleeding.  Supplement Fe.  Outpatient GI followup. 	  > DVT Prophylaxis - Lower extremity intermittent compression devices.

## 2018-10-03 NOTE — PROGRESS NOTE ADULT - PROVIDER SPECIALTY LIST ADULT
Anesthesia
Gastroenterology
Hospitalist

## 2018-10-04 LAB
HBV E AB SER-ACNC: NEGATIVE — SIGNIFICANT CHANGE UP
HBV E AG SER-ACNC: NEGATIVE — SIGNIFICANT CHANGE UP
LKM AB SER-ACNC: 0.9 UNITS — SIGNIFICANT CHANGE UP (ref 0–20)

## 2018-10-05 LAB — COPPER SERPL-MCNC: 17 UG/DL — LOW (ref 72–166)

## 2018-10-08 LAB — SURGICAL PATHOLOGY FINAL REPORT - CH: SIGNIFICANT CHANGE UP

## 2018-11-17 LAB
CULTURE RESULTS: SIGNIFICANT CHANGE UP
SPECIMEN SOURCE: SIGNIFICANT CHANGE UP

## 2019-04-15 PROBLEM — I10 ESSENTIAL (PRIMARY) HYPERTENSION: Chronic | Status: ACTIVE | Noted: 2018-09-26

## 2019-05-03 ENCOUNTER — APPOINTMENT (OUTPATIENT)
Dept: SURGERY | Facility: CLINIC | Age: 49
End: 2019-05-03

## 2019-06-29 ENCOUNTER — EMERGENCY (EMERGENCY)
Facility: HOSPITAL | Age: 49
LOS: 1 days | End: 2019-06-29
Attending: EMERGENCY MEDICINE
Payer: SELF-PAY

## 2019-06-29 PROCEDURE — 82962 GLUCOSE BLOOD TEST: CPT

## 2019-06-29 PROCEDURE — 99285 EMERGENCY DEPT VISIT HI MDM: CPT | Mod: 25

## 2019-06-29 PROCEDURE — 92950 HEART/LUNG RESUSCITATION CPR: CPT

## 2019-06-29 NOTE — ED PROVIDER NOTE - UNABLE TO OBTAIN
Unresponsive Unable to obtain pt's complete hx secondary to pt's current condition. Code Blue Activated.

## 2019-06-29 NOTE — ED PROVIDER NOTE - PHYSICAL EXAMINATION
apneic, no spintanous respiratorion, braeth sounds R greater then L while begin bagged    unresponsive  b/l pupils fixed and dilated  no cardiac beats auscultated  apneic, no spontaneous respirations  20:27

## 2019-06-29 NOTE — ED PROVIDER NOTE - CLINICAL SUMMARY MEDICAL DECISION MAKING FREE TEXT BOX
Spoke with pt's family, informed of pt's expiration, family requesting autopsy, will discuss case with Medical Examiner, as there is no clear mechanism of death.

## 2019-06-29 NOTE — ED PROVIDER NOTE - PROGRESS NOTE DETAILS
Spoke with pt's girlfriend, she states pt has had hx of cirhosis (secondary to EtOH abuse), umbilical hernia, HTN, takes anti-HTN meds and water pills. Pt follows up with doctors at [place], has a GI doctor, last saw a doctor 2 days ago.  Girlfriend was on the phone with pt prior to his cardiac arrest, pt had said his umbilical hernia was hurting today.  After girlfriend heard pt hit the ground she drove to his house, had to break the door open, and found pt laying on his bed with his mouth open and called 911. Spoke with pt's girlfriend, she states pt has had hx of cirrhosis (secondary to EtOH abuse), umbilical hernia, HTN, takes anti-HTN meds and water pills. Pt follows up with doctors at [place], has a GI doctor, last saw a doctor 2 days ago.  Girlfriend was on the phone with pt prior to his cardiac arrest, pt had said his umbilical hernia was hurting today.  After girlfriend heard pt hit the ground she drove to his house, had to break the door open, and found pt laying on his bed with his mouth open and called 911. Pt's name: Rell Chowdhury. : Mar 31st 1970. Pt lives at [address] [place] [address].  Pt's sister is next of kin, sister's name is Ciara Chowdhury, cell phone number is [phone]. Pt's name: Rell Chowdhury. : Mar 31st, 1970.  Pt lives at [address] [place] [address].  Pt's sister's name is Ciara Chowdhury, cell phone number is [phone]. Pt pronounced dead at 20:27, medical examiner's office contacted.

## 2019-06-29 NOTE — ED ADULT TRIAGE NOTE - CHIEF COMPLAINT QUOTE
Pt arrives in cardiac arrest CPR in progress.  Last known well 30 minutes prior to EMS arrival.  Intubated with C collar in place.  5 epi given PTA.

## 2019-06-29 NOTE — ED PROVIDER NOTE - OBJECTIVE STATEMENT
49 y/o M pt presents to the ED BIBA s/p unwitnessed cardiac arrest.  Per EMS, pt was last known well on the phone, person on the other end heard pt drop and then called 911 at 19:24.  EMS initated CPR approx 45 minutes PTA in the ED, 5 epis given en route, last epi given approx 5 minutes PTA in the ED.  Pt arrives in the ED on [address], intubated with 7.5 secured at 24 renetta, EMS reports pt has been in asystole since their arrival at the scene.  Code Blue activated. 47 y/o M pt presents to the ED BIBA s/p unwitnessed cardiac arrest.  Per EMS, pt was last known well on the phone, person on the other end heard pt drop and then called 911 at 19:24.  EMS initiated CPR approx 45 minutes PTA in the ED, 5 epis given en route, last epi given approx 5 minutes PTA in the ED, no bicarb given.  Pt arrives in the ED on [address], intubated with 7.5 secured at 24 renetta, EMS reports pt has been in asystole since their arrival at the scene.  Code Blue activated.

## 2020-12-08 NOTE — BRIEF OPERATIVE NOTE - TYPE OF ANESTHESIA
Local without sedation
Monitored Anesthesia Care with sedation
Island Pedicle Flap Text: The defect edges were debeveled with a #15 scalpel blade.  Given the location of the defect, shape of the defect and the proximity to free margins an island pedicle advancement flap was deemed most appropriate.  Using a sterile surgical marker, an appropriate advancement flap was drawn incorporating the defect, outlining the appropriate donor tissue and placing the expected incisions within the relaxed skin tension lines where possible.    The area thus outlined was incised deep to adipose tissue with a #15 scalpel blade.  The skin margins were undermined to an appropriate distance in all directions around the primary defect and laterally outward around the island pedicle utilizing iris scissors.  There was minimal undermining beneath the pedicle flap.
